# Patient Record
Sex: FEMALE | Race: WHITE | NOT HISPANIC OR LATINO | Employment: STUDENT | URBAN - METROPOLITAN AREA
[De-identification: names, ages, dates, MRNs, and addresses within clinical notes are randomized per-mention and may not be internally consistent; named-entity substitution may affect disease eponyms.]

---
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
## 2023-01-01 ENCOUNTER — APPOINTMENT (EMERGENCY)
Dept: ULTRASOUND IMAGING | Facility: HOSPITAL | Age: 18
End: 2023-01-01

## 2023-01-01 ENCOUNTER — HOSPITAL ENCOUNTER (EMERGENCY)
Facility: HOSPITAL | Age: 18
Discharge: HOME | End: 2023-01-01
Attending: EMERGENCY MEDICINE

## 2023-01-01 VITALS
HEART RATE: 95 BPM | DIASTOLIC BLOOD PRESSURE: 73 MMHG | TEMPERATURE: 97.8 F | OXYGEN SATURATION: 98 % | SYSTOLIC BLOOD PRESSURE: 124 MMHG | RESPIRATION RATE: 18 BRPM

## 2023-01-01 DIAGNOSIS — K80.20 CALCULUS OF GALLBLADDER WITHOUT CHOLECYSTITIS WITHOUT OBSTRUCTION: ICD-10-CM

## 2023-01-01 DIAGNOSIS — R10.13 EPIGASTRIC ABDOMINAL PAIN: Primary | ICD-10-CM

## 2023-01-01 LAB
ALBUMIN SERPL BCP-MCNC: 3.7 G/DL (ref 3.5–5)
ALP SERPL-CCNC: 127 U/L (ref 46–384)
ALT SERPL W P-5'-P-CCNC: 20 U/L (ref 12–78)
ANION GAP SERPL CALCULATED.3IONS-SCNC: 11 MMOL/L (ref 4–13)
APTT PPP: 40 SECONDS (ref 23–37)
AST SERPL W P-5'-P-CCNC: 51 U/L (ref 5–45)
BASOPHILS # BLD AUTO: 0.05 THOUSANDS/ÂΜL (ref 0–0.1)
BASOPHILS NFR BLD AUTO: 0 % (ref 0–1)
BILIRUB SERPL-MCNC: 0.37 MG/DL (ref 0.2–1)
BUN SERPL-MCNC: 12 MG/DL (ref 5–25)
CALCIUM SERPL-MCNC: 10 MG/DL (ref 8.3–10.1)
CHLORIDE SERPL-SCNC: 101 MMOL/L (ref 96–108)
CO2 SERPL-SCNC: 24 MMOL/L (ref 21–32)
CREAT SERPL-MCNC: 0.98 MG/DL (ref 0.6–1.3)
EOSINOPHIL # BLD AUTO: 0.03 THOUSAND/ÂΜL (ref 0–0.61)
EOSINOPHIL NFR BLD AUTO: 0 % (ref 0–6)
ERYTHROCYTE [DISTWIDTH] IN BLOOD BY AUTOMATED COUNT: 14.4 % (ref 11.6–15.1)
GFR SERPL CREATININE-BSD FRML MDRD: 83 ML/MIN/1.73SQ M
GLUCOSE SERPL-MCNC: 117 MG/DL (ref 65–140)
HCT VFR BLD AUTO: 38.3 % (ref 34.8–46.1)
HGB BLD-MCNC: 11.5 G/DL (ref 11.5–15.4)
IMM GRANULOCYTES # BLD AUTO: 0.04 THOUSAND/UL (ref 0–0.2)
IMM GRANULOCYTES NFR BLD AUTO: 0 % (ref 0–2)
INR PPP: 0.94 (ref 0.84–1.19)
LIPASE SERPL-CCNC: 73 U/L (ref 73–393)
LYMPHOCYTES # BLD AUTO: 1.52 THOUSANDS/ÂΜL (ref 0.6–4.47)
LYMPHOCYTES NFR BLD AUTO: 13 % (ref 14–44)
MCH RBC QN AUTO: 24.6 PG (ref 26.8–34.3)
MCHC RBC AUTO-ENTMCNC: 30 G/DL (ref 31.4–37.4)
MCV RBC AUTO: 82 FL (ref 82–98)
MONOCYTES # BLD AUTO: 0.52 THOUSAND/ÂΜL (ref 0.17–1.22)
MONOCYTES NFR BLD AUTO: 4 % (ref 4–12)
NEUTROPHILS # BLD AUTO: 9.87 THOUSANDS/ÂΜL (ref 1.85–7.62)
NEUTS SEG NFR BLD AUTO: 83 % (ref 43–75)
NRBC BLD AUTO-RTO: 0 /100 WBCS
PLATELET # BLD AUTO: 438 THOUSANDS/UL (ref 149–390)
PMV BLD AUTO: 10.6 FL (ref 8.9–12.7)
POTASSIUM SERPL-SCNC: 3.5 MMOL/L (ref 3.5–5.3)
PROT SERPL-MCNC: 8.5 G/DL (ref 6.4–8.4)
PROTHROMBIN TIME: 12.7 SECONDS (ref 11.6–14.5)
RBC # BLD AUTO: 4.68 MILLION/UL (ref 3.81–5.12)
SODIUM SERPL-SCNC: 136 MMOL/L (ref 135–147)
WBC # BLD AUTO: 12.03 THOUSAND/UL (ref 4.31–10.16)

## 2023-01-01 RX ORDER — ACETAMINOPHEN 325 MG/1
975 TABLET ORAL ONCE
Status: COMPLETED | OUTPATIENT
Start: 2023-01-01 | End: 2023-01-01

## 2023-01-01 RX ORDER — ONDANSETRON 4 MG/1
4 TABLET, ORALLY DISINTEGRATING ORAL EVERY 6 HOURS PRN
Qty: 20 TABLET | Refills: 0 | Status: SHIPPED | OUTPATIENT
Start: 2023-01-01

## 2023-01-01 RX ORDER — NAPROXEN 500 MG/1
500 TABLET ORAL 2 TIMES DAILY WITH MEALS
Qty: 30 TABLET | Refills: 0 | Status: SHIPPED | OUTPATIENT
Start: 2023-01-01

## 2023-01-01 RX ORDER — PANTOPRAZOLE SODIUM 40 MG/1
40 TABLET, DELAYED RELEASE ORAL DAILY
Qty: 21 TABLET | Refills: 0 | Status: SHIPPED | OUTPATIENT
Start: 2023-01-01

## 2023-01-01 RX ORDER — ONDANSETRON 4 MG/1
4 TABLET, ORALLY DISINTEGRATING ORAL ONCE
Status: COMPLETED | OUTPATIENT
Start: 2023-01-01 | End: 2023-01-01

## 2023-01-01 RX ADMIN — ACETAMINOPHEN 975 MG: 325 TABLET, FILM COATED ORAL at 05:02

## 2023-01-01 RX ADMIN — ONDANSETRON 4 MG: 4 TABLET, ORALLY DISINTEGRATING ORAL at 05:02

## 2023-01-01 NOTE — DISCHARGE INSTRUCTIONS
You have been seen for abdominal pain and nausea  Please take the prescribed protonix, zofran and naproxen as needed  Return to the emergency department if you develop worsening pain, vomiting, fevers or any other symptoms of concern  Please follow up with GI by calling the number provided

## 2023-01-01 NOTE — ED PROVIDER NOTES
History  Chief Complaint   Patient presents with   • Abdominal Pain     Patient states she has been having upper abdominal pain, had a baby three weeks ago  Patient states she had this pain during her pregnancy but it has not gone away  Maria L Cervantes is a 21y o  year old female presenting to the [place] [address] ED for epigastric abdominal pain  Patient has had three hours of epigastric pain  Currently 7/10, waxes/wanes in intensity  Intermittently radiating to the back  Associated nausea for which the patient has induced emesis  Patient had similar symptoms previously during recent pregnancy  Patient denies associated fevers, chest pain or dyspnea  No lower abdominal pain  No dysuria/hematuria/flank pain  Recently completed course of antibiotics for UTI  The patient has taken pecpid at home for symptomatic treatment  Surgical history notable for recent low transverse C section for delivery of first child  Reports minimal vaginal spotting  No pain at surgical incision site  No other abdominal surgeries previously  History provided by:  Patient   used: No    Abdominal Pain  Associated symptoms: nausea and vaginal bleeding    Associated symptoms: no chest pain, no chills, no cough, no diarrhea, no dysuria, no fever, no hematuria, no shortness of breath and no vomiting        Prior to Admission Medications   Prescriptions Last Dose Informant Patient Reported?  Taking?   docusate sodium (COLACE) 50 mg capsule   No No   Sig: Take 1 capsule (50 mg total) by mouth 2 (two) times a day   ibuprofen (MOTRIN) 600 mg tablet   No No   Sig: Take 1 tablet (600 mg total) by mouth every 6 (six) hours   Patient not taking: Reported on 2022      Facility-Administered Medications: None       Past Medical History:   Diagnosis Date   • Obesity (BMI 35 0-39 9 without comorbidity) 2022       Past Surgical History:   Procedure Laterality Date   • MYRINGOTOMY W/ TUBES     • MA  DELIVERY ONLY N/A 12/10/2022    Procedure:  SECTION (); Surgeon: Ewing Holter, DO;  Location: [facility];  Service: Obstetrics       Family History   Problem Relation Age of Onset   • Hyperlipidemia Mother    • No Known Problems Father      I have reviewed and agree with the history as documented  E-Cigarette/Vaping   • E-Cigarette Use Former User      E-Cigarette/Vaping Substances   • Nicotine Yes      Social History     Tobacco Use   • Smoking status: Never   • Smokeless tobacco: Never   Vaping Use   • Vaping Use: Former   • Substances: Nicotine   Substance Use Topics   • Alcohol use: Never   • Drug use: Never       Review of Systems   Constitutional: Negative for chills and fever  Respiratory: Negative for cough and shortness of breath  Cardiovascular: Negative for chest pain and leg swelling  Gastrointestinal: Positive for abdominal pain and nausea  Negative for abdominal distention, diarrhea and vomiting  Endocrine: Negative for polyuria  Genitourinary: Positive for vaginal bleeding  Negative for difficulty urinating, dysuria, flank pain and hematuria  Musculoskeletal: Positive for back pain  Skin: Negative for rash  Psychiatric/Behavioral: Negative for behavioral problems and confusion  All other systems reviewed and are negative  Physical Exam  Physical Exam  Vitals and nursing note reviewed  Constitutional:       General: She is not in acute distress  Appearance: Normal appearance  She is well-developed  She is not ill-appearing, toxic-appearing or diaphoretic  HENT:      Head: Normocephalic and atraumatic  Nose: No congestion or rhinorrhea  Eyes:      General:         Right eye: No discharge  Left eye: No discharge  Cardiovascular:      Rate and Rhythm: Normal rate and regular rhythm  Pulmonary:      Effort: Pulmonary effort is normal  No accessory muscle usage or respiratory distress  Breath sounds: Normal breath sounds  No stridor   No decreased breath sounds, wheezing, rhonchi or rales  Abdominal:      General: Bowel sounds are normal  There is no distension  Palpations: Abdomen is soft  Tenderness: There is abdominal tenderness in the epigastric area  There is no right CVA tenderness, left CVA tenderness, guarding or rebound  Negative signs include Kwon's sign and McBurney's sign  Musculoskeletal:      Cervical back: Normal range of motion and neck supple  Right lower leg: No tenderness  No edema  Left lower leg: No tenderness  No edema  Skin:     Capillary Refill: Capillary refill takes less than 2 seconds  Comments: Midline transverse lower abdominal incision c/d/i   Neurological:      Mental Status: She is alert and oriented to person, place, and time     Psychiatric:         Mood and Affect: Mood normal          Behavior: Behavior normal          Vital Signs  ED Triage Vitals [01/01/23 0401]   Temperature Pulse Respirations Blood Pressure SpO2   97 8 °F (36 6 °C) 95 18 124/73 98 %      Temp src Heart Rate Source Patient Position - Orthostatic VS BP Location FiO2 (%)   -- -- Lying Left arm --      Pain Score       --           Vitals:    01/01/23 0401   BP: 124/73   Pulse: 95   Patient Position - Orthostatic VS: Lying         Visual Acuity      ED Medications  Medications   ondansetron (ZOFRAN-ODT) dispersible tablet 4 mg (4 mg Oral Given 1/1/23 0502)   acetaminophen (TYLENOL) tablet 975 mg (975 mg Oral Given 1/1/23 0502)       Diagnostic Studies  Results Reviewed     Procedure Component Value Units Date/Time    Comprehensive metabolic panel [[id number]]  (Abnormal) Collected: 01/01/23 0417    Lab Status: Final result Specimen: Blood from Hand, Right Updated: 01/01/23 0443     Sodium 136 mmol/L      Potassium 3 5 mmol/L      Chloride 101 mmol/L      CO2 24 mmol/L      ANION GAP 11 mmol/L      BUN 12 mg/dL      Creatinine 0 98 mg/dL      Glucose 117 mg/dL      Calcium 10 0 mg/dL      AST 51 U/L      ALT 20 U/L Alkaline Phosphatase 127 U/L      Total Protein 8 5 g/dL      Albumin 3 7 g/dL      Total Bilirubin 0 37 mg/dL      eGFR 83 ml/min/1 73sq m     Narrative:      National Kidney Disease Foundation guidelines for Chronic Kidney Disease (CKD):   •  Stage 1 with normal or high GFR (GFR > 90 mL/min/1 73 square meters)  •  Stage 2 Mild CKD (GFR = 60-89 mL/min/1 73 square meters)  •  Stage 3A Moderate CKD (GFR = 45-59 mL/min/1 73 square meters)  •  Stage 3B Moderate CKD (GFR = 30-44 mL/min/1 73 square meters)  •  Stage 4 Severe CKD (GFR = 15-29 mL/min/1 73 square meters)  •  Stage 5 End Stage CKD (GFR <15 mL/min/1 73 square meters)  Note: GFR calculation is accurate only with a steady state creatinine    Protime-INR [[id number]]  (Normal) Collected: 01/01/23 0417    Lab Status: Final result Specimen: Blood from Arm, Right Updated: 01/01/23 0442     Protime 12 7 seconds      INR 0 94    APTT [[id number]]  (Abnormal) Collected: 01/01/23 0417    Lab Status: Final result Specimen: Blood from Arm, Right Updated: 01/01/23 0442     PTT 40 seconds     Lipase [[id number]]  (Normal) Collected: 01/01/23 0417    Lab Status: Final result Specimen: Blood from Arm, Right Updated: 01/01/23 0439     Lipase 73 u/L     CBC and differential [[id number]]  (Abnormal) Collected: 01/01/23 0417    Lab Status: Final result Specimen: Blood from Hand, Right Updated: 01/01/23 0429     WBC 12 03 Thousand/uL      RBC 4 68 Million/uL      Hemoglobin 11 5 g/dL      Hematocrit 38 3 %      MCV 82 fL      MCH 24 6 pg      MCHC 30 0 g/dL      RDW 14 4 %      MPV 10 6 fL      Platelets 554 Thousands/uL      nRBC 0 /100 WBCs      Neutrophils Relative 83 %      Immat GRANS % 0 %      Lymphocytes Relative 13 %      Monocytes Relative 4 %      Eosinophils Relative 0 %      Basophils Relative 0 %      Neutrophils Absolute 9 87 Thousands/µL      Immature Grans Absolute 0 04 Thousand/uL      Lymphocytes Absolute 1 52 Thousands/µL      Monocytes Absolute 0 52 Thousand/µL Eosinophils Absolute 0 03 Thousand/µL      Basophils Absolute 0 05 Thousands/µL                  US right upper quadrant   Final Result by Aleena Martinez MD (01/01 0703)      Gallstones  No secondary signs of cholecystitis  Workstation performed: [id number]                    Procedures  Procedures         ED Course  ED Course as of 01/01/23 0833   Jose Ramon Fill Jan 01, 2023   0645 Reassessed, pain has resolved  Well appearing in ED  Pending RUQ US for dispo  Medical Decision Making    21 y o  female presenting for epigastric pain  VSS, nontoxic appearing  TTP epigastrium without r/g  Will order labs and RUQ US to evaluate for biliary disease, pancreatitis, cholecystitis or other etiology of symptoms  Reassessment: pain resolved during ED course  Reviewed labs and US results with patient and mother at bedside  Suspect symptoms may be related to biliary colic  Disposition: I have discussed with the patient our plan to discharge them from the ED and the patient is in agreement with this plan  Discharge Plan: Rx for naproxen, zofran, protonix should symptoms return  Discussed possibility of disease progression and failure of outpatient treatment  RTED precautions emphasized  The patient was provided a written after visit summary with strict RTED precautions  Followup: I have discussed with the patient plan to follow up with general surgery and GI  Contact information provided in AVS     Calculus of gallbladder without cholecystitis without obstruction: undiagnosed new problem with uncertain prognosis  Epigastric abdominal pain: acute illness or injury  Amount and/or Complexity of Data Reviewed  Labs: ordered  Radiology: ordered  Risk  Prescription drug management            Disposition  Final diagnoses:   Epigastric abdominal pain   Calculus of gallbladder without cholecystitis without obstruction     Time reflects when diagnosis was documented in both MDM as applicable and the Disposition within this note     Time User Action Codes Description Comment    1/1/2023  6:46 AM Massiel Grater Add [R10 13] Epigastric abdominal pain     1/1/2023  7:13 AM Massiel Grater Add [K80 20] Calculus of gallbladder without cholecystitis without obstruction       ED Disposition     ED Disposition   Discharge    Condition   Stable    Date/Time   Sun Jan 1, 2023  7:08 AM    Comment   Emilio Chin discharge to home/self care  Follow-up Information     Follow up With Specialties Details Why Contact Info Additional [facility] Schedule an appointment as soon as possible for a visit  To make appointment for reevaluation in 3-5 days  [address] [id number]  Ricardo [facility] [address], [address] [place] [address]    Olga Kowalski,  General Surgery, Wound Care  For reevaluation if symptoms do not resolve   [address] Martinez   [phone]             Discharge Medication List as of 1/1/2023  7:13 AM      START taking these medications    Details   naproxen (Naprosyn) 500 mg tablet Take 1 tablet (500 mg total) by mouth 2 (two) times a day with meals, Starting Sun 1/1/2023, Normal      ondansetron (ZOFRAN-ODT) 4 mg disintegrating tablet Take 1 tablet (4 mg total) by mouth every 6 (six) hours as needed for nausea or vomiting, Starting Sun 1/1/2023, Normal      pantoprazole (PROTONIX) 40 mg tablet Take 1 tablet (40 mg total) by mouth daily, Starting Sun 1/1/2023, Normal         CONTINUE these medications which have NOT CHANGED    Details   docusate sodium (COLACE) 50 mg capsule Take 1 capsule (50 mg total) by mouth 2 (two) times a day, Starting Wed 12/14/2022, No Print      ibuprofen (MOTRIN) 600 mg tablet Take 1 tablet (600 mg total) by mouth every 6 (six) hours, Starting Wed 12/14/2022, Normal No discharge procedures on file      PDMP Review     None          ED Provider  Electronically Signed by           Thu Collins DO  01/01/23 5998

## 2023-01-21 ENCOUNTER — APPOINTMENT (EMERGENCY)
Dept: ULTRASOUND IMAGING | Facility: HOSPITAL | Age: 18
End: 2023-01-21

## 2023-01-21 ENCOUNTER — HOSPITAL ENCOUNTER (EMERGENCY)
Facility: HOSPITAL | Age: 18
Discharge: HOME | End: 2023-01-21
Attending: EMERGENCY MEDICINE

## 2023-01-21 VITALS
HEART RATE: 61 BPM | BODY MASS INDEX: 36.29 KG/M2 | SYSTOLIC BLOOD PRESSURE: 116 MMHG | DIASTOLIC BLOOD PRESSURE: 66 MMHG | WEIGHT: 224.87 LBS | OXYGEN SATURATION: 94 % | RESPIRATION RATE: 18 BRPM | TEMPERATURE: 97.6 F

## 2023-01-21 DIAGNOSIS — K80.20 CHOLELITHIASIS: Primary | ICD-10-CM

## 2023-01-21 LAB
ALBUMIN SERPL BCP-MCNC: 4 G/DL (ref 3.5–5)
ALP SERPL-CCNC: 95 U/L (ref 34–104)
ALT SERPL W P-5'-P-CCNC: 17 U/L (ref 7–52)
ANION GAP SERPL CALCULATED.3IONS-SCNC: 9 MMOL/L (ref 4–13)
AST SERPL W P-5'-P-CCNC: 69 U/L (ref 13–39)
BASOPHILS # BLD AUTO: 0.04 THOUSANDS/ÂΜL (ref 0–0.1)
BASOPHILS NFR BLD AUTO: 0 % (ref 0–1)
BILIRUB SERPL-MCNC: 0.39 MG/DL (ref 0.2–1)
BILIRUB UR QL STRIP: NEGATIVE
BUN SERPL-MCNC: 6 MG/DL (ref 5–25)
CALCIUM SERPL-MCNC: 9.7 MG/DL (ref 8.4–10.2)
CHLORIDE SERPL-SCNC: 101 MMOL/L (ref 96–108)
CLARITY UR: CLEAR
CO2 SERPL-SCNC: 26 MMOL/L (ref 21–32)
COLOR UR: NORMAL
CREAT SERPL-MCNC: 0.77 MG/DL (ref 0.6–1.3)
EOSINOPHIL # BLD AUTO: 0.06 THOUSAND/ÂΜL (ref 0–0.61)
EOSINOPHIL NFR BLD AUTO: 0 % (ref 0–6)
ERYTHROCYTE [DISTWIDTH] IN BLOOD BY AUTOMATED COUNT: 14.6 % (ref 11.6–15.1)
EXT PREGNANCY TEST URINE: NEGATIVE
EXT. CONTROL: NORMAL
GFR SERPL CREATININE-BSD FRML MDRD: 112 ML/MIN/1.73SQ M
GLUCOSE SERPL-MCNC: 98 MG/DL (ref 65–140)
GLUCOSE UR STRIP-MCNC: NEGATIVE MG/DL
HCT VFR BLD AUTO: 34.6 % (ref 34.8–46.1)
HGB BLD-MCNC: 10.7 G/DL (ref 11.5–15.4)
HGB UR QL STRIP.AUTO: NEGATIVE
IMM GRANULOCYTES # BLD AUTO: 0.06 THOUSAND/UL (ref 0–0.2)
IMM GRANULOCYTES NFR BLD AUTO: 0 % (ref 0–2)
KETONES UR STRIP-MCNC: NEGATIVE MG/DL
LEUKOCYTE ESTERASE UR QL STRIP: NEGATIVE
LIPASE SERPL-CCNC: 19 U/L (ref 11–82)
LYMPHOCYTES # BLD AUTO: 1.33 THOUSANDS/ÂΜL (ref 0.6–4.47)
LYMPHOCYTES NFR BLD AUTO: 9 % (ref 14–44)
MCH RBC QN AUTO: 25 PG (ref 26.8–34.3)
MCHC RBC AUTO-ENTMCNC: 30.9 G/DL (ref 31.4–37.4)
MCV RBC AUTO: 81 FL (ref 82–98)
MONOCYTES # BLD AUTO: 0.68 THOUSAND/ÂΜL (ref 0.17–1.22)
MONOCYTES NFR BLD AUTO: 5 % (ref 4–12)
NEUTROPHILS # BLD AUTO: 11.94 THOUSANDS/ÂΜL (ref 1.85–7.62)
NEUTS SEG NFR BLD AUTO: 86 % (ref 43–75)
NITRITE UR QL STRIP: NEGATIVE
NRBC BLD AUTO-RTO: 0 /100 WBCS
PH UR STRIP.AUTO: 6 [PH]
PLATELET # BLD AUTO: 332 THOUSANDS/UL (ref 149–390)
PMV BLD AUTO: 10.6 FL (ref 8.9–12.7)
POTASSIUM SERPL-SCNC: 3.5 MMOL/L (ref 3.5–5.3)
PROT SERPL-MCNC: 7.5 G/DL (ref 6.4–8.4)
PROT UR STRIP-MCNC: NEGATIVE MG/DL
RBC # BLD AUTO: 4.28 MILLION/UL (ref 3.81–5.12)
SODIUM SERPL-SCNC: 136 MMOL/L (ref 135–147)
SP GR UR STRIP.AUTO: <=1.005 (ref 1–1.03)
UROBILINOGEN UR QL STRIP.AUTO: 0.2 E.U./DL
WBC # BLD AUTO: 14.11 THOUSAND/UL (ref 4.31–10.16)

## 2023-01-21 RX ORDER — KETOROLAC TROMETHAMINE 30 MG/ML
10 INJECTION, SOLUTION INTRAMUSCULAR; INTRAVENOUS ONCE
Status: COMPLETED | OUTPATIENT
Start: 2023-01-21 | End: 2023-01-21

## 2023-01-21 RX ADMIN — KETOROLAC TROMETHAMINE 9.9 MG: 30 INJECTION, SOLUTION INTRAMUSCULAR; INTRAVENOUS at 06:27

## 2023-01-21 NOTE — ED PROVIDER NOTES
History  Chief Complaint   Patient presents with   • Abdominal Pain     Mid abdominal that radiates into upper back  Per pt pain has been going on for months, thought was due to pregnancy which gave birth in December  Pain comes and goes per pt  Per pt vomited x2 around [id number]     This is a 23-year-old woman with the noted past medical history who presents to the emergency department with epigastric to right upper quadrant pain radiating into the back occurring over about the past 5 hours  She did vomit at the peak of the pain, but the pain has become less severe since onset and at this point is mostly resolved  This is a rather severe recurrence of pain the patient had been having intermittently for about the past 4 5 months or so  She was seen in this emergency department on 2023 with similar pain and found to have cholelithiasis without evidence of acute cholecystitis  She has had several episodes of pain between  and now that were of shorter duration  They seemed to occur mostly at night  They are not associated with eating  They have never lasted as long as the current symptoms did  She never really needed to take anything to treat the symptoms previously  She did take ondansetron during the current episode but did not take any analgesics per se to resolve the pain  She has an appointment scheduled with her primary physician on Monday,  for further evaluation  She underwent a  in 1945 that was uncomplicated and for which she had otherwise been recovering well  No other GI or  surgery  No fevers/dysuria/hematuria/urgency/frequency  DDx includes but is not limited to: Biliary colic, cholecystitis, choledocholithiasis  If any surgical findings, will obviously consult with general surgery  Otherwise, patient can see primary physician and/or surgeon for further evaluation    She is sufficiently symptomatic on a sufficiently prolonged basis that elective cholecystectomy can certainly be considered  Check CBC/CMP/lipase/UA/UPT and right upper quadrant ultrasound  History provided by:  Medical records and patient  Abdominal Pain  Associated symptoms: nausea and vomiting    Associated symptoms: no chills, no diarrhea, no dysuria, no fatigue, no fever and no hematuria        Prior to Admission Medications   Prescriptions Last Dose Informant Patient Reported? Taking?   docusate sodium (COLACE) 50 mg capsule   No No   Sig: Take 1 capsule (50 mg total) by mouth 2 (two) times a day   ibuprofen (MOTRIN) 600 mg tablet   No No   Sig: Take 1 tablet (600 mg total) by mouth every 6 (six) hours   Patient not taking: Reported on 2022   naproxen (Naprosyn) 500 mg tablet   No No   Sig: Take 1 tablet (500 mg total) by mouth 2 (two) times a day with meals   ondansetron (ZOFRAN-ODT) 4 mg disintegrating tablet   No No   Sig: Take 1 tablet (4 mg total) by mouth every 6 (six) hours as needed for nausea or vomiting   pantoprazole (PROTONIX) 40 mg tablet   No No   Sig: Take 1 tablet (40 mg total) by mouth daily      Facility-Administered Medications: None       Past Medical History:   Diagnosis Date   • Obesity (BMI 35 0-39 9 without comorbidity) 2022       Past Surgical History:   Procedure Laterality Date   • MYRINGOTOMY W/ TUBES     • [place]  DELIVERY ONLY N/A 12/10/2022    Procedure:  SECTION (); Surgeon: Tiffanie Lewis DO;  Location: [facility];  Service: Obstetrics       Family History   Problem Relation Age of Onset   • Hyperlipidemia Mother    • No Known Problems Father      I have reviewed and agree with the history as documented      E-Cigarette/Vaping   • E-Cigarette Use Former User      E-Cigarette/Vaping Substances   • Nicotine Yes      Social History     Tobacco Use   • Smoking status: Never   • Smokeless tobacco: Never   Vaping Use   • Vaping Use: Former   • Substances: Nicotine   Substance Use Topics   • Alcohol use: Never   • Drug use: Never       Review of Systems   Constitutional: Negative for chills, diaphoresis, fatigue and fever  Respiratory: Negative  Cardiovascular: Negative  Gastrointestinal: Positive for abdominal pain, nausea and vomiting  Negative for abdominal distention and diarrhea  Genitourinary: Negative for dysuria, flank pain, hematuria, pelvic pain and urgency  Musculoskeletal: Negative  Hematological: Negative  Physical Exam  Physical Exam  Vitals and nursing note reviewed  Constitutional:       General: She is awake  She is not in acute distress  Appearance: Normal appearance  She is well-developed  HENT:      Head: Normocephalic and atraumatic  Right Ear: Hearing and external ear normal       Left Ear: Hearing and external ear normal    Neck:      Trachea: Trachea and phonation normal    Cardiovascular:      Rate and Rhythm: Normal rate and regular rhythm  Pulses:           Radial pulses are 2+ on the right side and 2+ on the left side  Dorsalis pedis pulses are 2+ on the right side and 2+ on the left side  Posterior tibial pulses are 2+ on the right side and 2+ on the left side  Heart sounds: Normal heart sounds, S1 normal and S2 normal  No murmur heard  No friction rub  No gallop  Pulmonary:      Effort: Pulmonary effort is normal  No respiratory distress  Breath sounds: Normal breath sounds  No stridor  No decreased breath sounds, wheezing, rhonchi or rales  Abdominal:      General: There is no distension  Palpations: There is no mass  Tenderness: There is abdominal tenderness in the right upper quadrant  There is no right CVA tenderness, left CVA tenderness, guarding or rebound  Negative signs include Kwon's sign  Comments: Well-healed low transverse  incision   Skin:     General: Skin is warm and dry  Neurological:      Mental Status: She is alert and oriented to person, place, and time  GCS: GCS eye subscore is 4   GCS verbal subscore is 5  GCS motor subscore is 6  Cranial Nerves: No cranial nerve deficit  Sensory: No sensory deficit  Motor: No abnormal muscle tone  Comments: PERRLA; EOMI  Sensation intact to light touch over face in V1-V3 distribution bilaterally  Facial expressions symmetric  Tongue/uvula midline  Shoulder shrug equal bilaterally  Strength 5/5 in UE/LE bilaterally  Sensation intact to light touch in UE/LE bilaterally           Vital Signs  ED Triage Vitals   Temperature Pulse Respirations Blood Pressure SpO2   01/21/23 0519 01/21/23 0519 01/21/23 0519 01/21/23 0519 01/21/23 0519   (!) 96 9 °F (36 1 °C) 84 18 118/66 96 %      Temp Source Heart Rate Source Patient Position - Orthostatic VS BP Location FiO2 (%)   01/21/23 0519 01/21/23 0519 01/21/23 0815 01/21/23 0815 --   Temporal Monitor Lying Right arm       Pain Score       01/21/23 0519       4           Vitals:    01/21/23 0519 01/21/23 0815   BP: 118/66 116/66   Pulse: 84 61   Patient Position - Orthostatic VS:  Lying         Visual Acuity      ED Medications  Medications   ketorolac (TORADOL) injection 9 9 mg (9 9 mg Intravenous Given 1/21/23 6364)       Diagnostic Studies  Results Reviewed     Procedure Component Value Units Date/Time    UA w Reflex to Microscopic w Reflex to Culture [[id number]] Collected: 01/21/23 0731    Lab Status: Final result Specimen: Urine, Clean Catch Updated: 01/21/23 0752     Color, UA Light Yellow     Clarity, UA Clear     Specific Gravity, UA <=1 005     pH, UA 6 0     Leukocytes, UA Negative     Nitrite, UA Negative     Protein, UA Negative mg/dl      Glucose, UA Negative mg/dl      Ketones, UA Negative mg/dl      Urobilinogen, UA 0 2 E U /dl      Bilirubin, UA Negative     Occult Blood, UA Negative    POCT pregnancy, urine [[id number]]  (Normal) Resulted: 01/21/23 0732    Lab Status: Final result Specimen: Urine Updated: 01/21/23 0732     EXT Preg Test, Ur Negative     Control Valid    CMP [[id number]] (Abnormal) Collected: 01/21/23 0559    Lab Status: Final result Specimen: Blood from Arm, Right Updated: 01/21/23 0631     Sodium 136 mmol/L      Potassium 3 5 mmol/L      Chloride 101 mmol/L      CO2 26 mmol/L      ANION GAP 9 mmol/L      BUN 6 mg/dL      Creatinine 0 77 mg/dL      Glucose 98 mg/dL      Calcium 9 7 mg/dL      AST 69 U/L      ALT 17 U/L      Alkaline Phosphatase 95 U/L      Total Protein 7 5 g/dL      Albumin 4 0 g/dL      Total Bilirubin 0 39 mg/dL      eGFR 112 ml/min/1 73sq m     Narrative:      [facility] guidelines for Chronic Kidney Disease (CKD):   •  Stage 1 with normal or high GFR (GFR > 90 mL/min/1 73 square meters)  •  Stage 2 Mild CKD (GFR = 60-89 mL/min/1 73 square meters)  •  Stage 3A Moderate CKD (GFR = 45-59 mL/min/1 73 square meters)  •  Stage 3B Moderate CKD (GFR = 30-44 mL/min/1 73 square meters)  •  Stage 4 Severe CKD (GFR = 15-29 mL/min/1 73 square meters)  •  Stage 5 End Stage CKD (GFR <15 mL/min/1 73 square meters)  Note: GFR calculation is accurate only with a steady state creatinine    Lipase [[id number]]  (Normal) Collected: 01/21/23 0559    Lab Status: Final result Specimen: Blood from Arm, Right Updated: 01/21/23 0631     Lipase 19 u/L     CBC and differential [[id number]]  (Abnormal) Collected: 01/21/23 0559    Lab Status: Final result Specimen: Blood from Arm, Right Updated: 01/21/23 0611     WBC 14 11 Thousand/uL      RBC 4 28 Million/uL      Hemoglobin 10 7 g/dL      Hematocrit 34 6 %      MCV 81 fL      MCH 25 0 pg      MCHC 30 9 g/dL      RDW 14 6 %      MPV 10 6 fL      Platelets 513 Thousands/uL      nRBC 0 /100 WBCs      Neutrophils Relative 86 %      Immat GRANS % 0 %      Lymphocytes Relative 9 %      Monocytes Relative 5 %      Eosinophils Relative 0 %      Basophils Relative 0 %      Neutrophils Absolute 11 94 Thousands/µL      Immature Grans Absolute 0 06 Thousand/uL      Lymphocytes Absolute 1 33 Thousands/µL      Monocytes Absolute 0 68 Thousand/µL      Eosinophils Absolute 0 06 Thousand/µL      Basophils Absolute 0 04 Thousands/µL                  US right upper quadrant   Final Result by Sherin Petit MD (01/21 5918)      Cholelithiasis with no evidence of acute cholecystitis  Normal caliber common bile duct  Workstation performed: [id number]                    Procedures  Procedures         ED Course  ED Course as of 01/22/23 1652   Sat Jan 21, 2023   0615 CBC and differential(!)  There is a mild leukocytosis  Anemia representing a mild decrement compared to prior  Platelets normal   2318 Lipase  Normal   0633 CMP(!)  Very mildly increased AST (increased compared to prior)  Other transaminases wnl  Electrolytes wnl   0657 Patient updated regarding results of workup thus far  Awaiting RUQ US at this point  4597 POCT pregnancy, urine  Negative   0748 Sign over to Dr Gabriela Galindo             MDM    Disposition  Final diagnoses:   Cholelithiasis     Time reflects when diagnosis was documented in both MDM as applicable and the Disposition within this note     Time User Action Codes Description Comment    1/21/2023  8:26 AM Bobo Garcia Add [K80 20] Cholelithiasis       ED Disposition     ED Disposition   Discharge    Condition   Stable    Date/Time   Sat Jan 21, 2023  8:25 AM    Comment   Kathy Chin discharge to home/self care                 Follow-up Information     Follow up With Specialties Details Why Contact Info Additional Ariel Mccallum [address] General Surgery   [address] [address]  [address] [facility] [address], [place], [address] [facility], [address] [facility]          Discharge Medication List as of 1/21/2023  8:26 AM      CONTINUE these medications which have NOT CHANGED    Details   docusate sodium (COLACE) 50 mg capsule Take 1 capsule (50 mg total) by mouth 2 (two) times a day, Starting Wed 12/14/2022, No Print      ibuprofen (MOTRIN) 600 mg tablet Take 1 tablet (600 mg total) by mouth every 6 (six) hours, Starting Wed 12/14/2022, Normal      naproxen (Naprosyn) 500 mg tablet Take 1 tablet (500 mg total) by mouth 2 (two) times a day with meals, Starting Sun 1/1/2023, Normal      ondansetron (ZOFRAN-ODT) 4 mg disintegrating tablet Take 1 tablet (4 mg total) by mouth every 6 (six) hours as needed for nausea or vomiting, Starting Sun 1/1/2023, Normal      pantoprazole (PROTONIX) 40 mg tablet Take 1 tablet (40 mg total) by mouth daily, Starting Sun 1/1/2023, Normal             No discharge procedures on file      PDMP Review     None          ED Provider  Electronically Signed by           Jannet Quintanilla DO  01/22/23 8402

## 2023-01-24 ENCOUNTER — HOSPITAL ENCOUNTER (EMERGENCY)
Facility: HOSPITAL | Age: 18
Discharge: HOME | End: 2023-01-24
Attending: EMERGENCY MEDICINE

## 2023-01-24 VITALS
TEMPERATURE: 97.8 F | DIASTOLIC BLOOD PRESSURE: 64 MMHG | SYSTOLIC BLOOD PRESSURE: 129 MMHG | HEART RATE: 65 BPM | OXYGEN SATURATION: 98 % | BODY MASS INDEX: 33.91 KG/M2 | HEIGHT: 66 IN | WEIGHT: 211 LBS | RESPIRATION RATE: 20 BRPM

## 2023-01-24 DIAGNOSIS — R79.89 ELEVATED LFTS: ICD-10-CM

## 2023-01-24 DIAGNOSIS — K80.20 GALLSTONES: ICD-10-CM

## 2023-01-24 DIAGNOSIS — K80.50 BILIARY COLIC: ICD-10-CM

## 2023-01-24 DIAGNOSIS — R10.11 RUQ PAIN: Primary | ICD-10-CM

## 2023-01-24 LAB
ALBUMIN SERPL BCP-MCNC: 3.9 G/DL (ref 3.5–5)
ALP SERPL-CCNC: 141 U/L (ref 34–104)
ALT SERPL W P-5'-P-CCNC: 57 U/L (ref 7–52)
ANION GAP SERPL CALCULATED.3IONS-SCNC: 9 MMOL/L (ref 4–13)
AST SERPL W P-5'-P-CCNC: 225 U/L (ref 13–39)
BASOPHILS # BLD AUTO: 0.06 THOUSANDS/ÂΜL (ref 0–0.1)
BASOPHILS NFR BLD AUTO: 1 % (ref 0–1)
BILIRUB SERPL-MCNC: 0.75 MG/DL (ref 0.2–1)
BILIRUB UR QL STRIP: NEGATIVE
BUN SERPL-MCNC: 8 MG/DL (ref 5–25)
CALCIUM SERPL-MCNC: 9.5 MG/DL (ref 8.4–10.2)
CHLORIDE SERPL-SCNC: 104 MMOL/L (ref 96–108)
CLARITY UR: CLEAR
CO2 SERPL-SCNC: 24 MMOL/L (ref 21–32)
COLOR UR: YELLOW
CREAT SERPL-MCNC: 0.74 MG/DL (ref 0.6–1.3)
EOSINOPHIL # BLD AUTO: 0.05 THOUSAND/ÂΜL (ref 0–0.61)
EOSINOPHIL NFR BLD AUTO: 0 % (ref 0–6)
ERYTHROCYTE [DISTWIDTH] IN BLOOD BY AUTOMATED COUNT: 14.3 % (ref 11.6–15.1)
EXT PREGNANCY TEST URINE: NEGATIVE
EXT. CONTROL: NORMAL
GFR SERPL CREATININE-BSD FRML MDRD: 117 ML/MIN/1.73SQ M
GLUCOSE SERPL-MCNC: 115 MG/DL (ref 65–140)
GLUCOSE UR STRIP-MCNC: NEGATIVE MG/DL
HCT VFR BLD AUTO: 36.7 % (ref 34.8–46.1)
HGB BLD-MCNC: 11.2 G/DL (ref 11.5–15.4)
HGB UR QL STRIP.AUTO: NEGATIVE
IMM GRANULOCYTES # BLD AUTO: 0.05 THOUSAND/UL (ref 0–0.2)
IMM GRANULOCYTES NFR BLD AUTO: 0 % (ref 0–2)
KETONES UR STRIP-MCNC: NEGATIVE MG/DL
LEUKOCYTE ESTERASE UR QL STRIP: NEGATIVE
LIPASE SERPL-CCNC: 10 U/L (ref 11–82)
LYMPHOCYTES # BLD AUTO: 1.47 THOUSANDS/ÂΜL (ref 0.6–4.47)
LYMPHOCYTES NFR BLD AUTO: 12 % (ref 14–44)
MCH RBC QN AUTO: 24.3 PG (ref 26.8–34.3)
MCHC RBC AUTO-ENTMCNC: 30.5 G/DL (ref 31.4–37.4)
MCV RBC AUTO: 80 FL (ref 82–98)
MONOCYTES # BLD AUTO: 0.62 THOUSAND/ÂΜL (ref 0.17–1.22)
MONOCYTES NFR BLD AUTO: 5 % (ref 4–12)
NEUTROPHILS # BLD AUTO: 10.02 THOUSANDS/ÂΜL (ref 1.85–7.62)
NEUTS SEG NFR BLD AUTO: 82 % (ref 43–75)
NITRITE UR QL STRIP: NEGATIVE
NRBC BLD AUTO-RTO: 0 /100 WBCS
PH UR STRIP.AUTO: 6 [PH] (ref 4.5–8)
PLATELET # BLD AUTO: 378 THOUSANDS/UL (ref 149–390)
PMV BLD AUTO: 10.7 FL (ref 8.9–12.7)
POTASSIUM SERPL-SCNC: 3.7 MMOL/L (ref 3.5–5.3)
PROT SERPL-MCNC: 7.7 G/DL (ref 6.4–8.4)
PROT UR STRIP-MCNC: NEGATIVE MG/DL
RBC # BLD AUTO: 4.6 MILLION/UL (ref 3.81–5.12)
SODIUM SERPL-SCNC: 137 MMOL/L (ref 135–147)
SP GR UR STRIP.AUTO: 1.02 (ref 1–1.03)
UROBILINOGEN UR QL STRIP.AUTO: 0.2 E.U./DL
WBC # BLD AUTO: 12.27 THOUSAND/UL (ref 4.31–10.16)

## 2023-01-24 RX ORDER — ONDANSETRON 2 MG/ML
4 INJECTION INTRAMUSCULAR; INTRAVENOUS ONCE
Status: COMPLETED | OUTPATIENT
Start: 2023-01-24 | End: 2023-01-24

## 2023-01-24 RX ORDER — AMOXICILLIN AND CLAVULANATE POTASSIUM 875; 125 MG/1; MG/1
1 TABLET, FILM COATED ORAL EVERY 12 HOURS
Qty: 10 TABLET | Refills: 0 | Status: SHIPPED | OUTPATIENT
Start: 2023-01-24 | End: 2023-01-29

## 2023-01-24 RX ORDER — OXYCODONE HYDROCHLORIDE AND ACETAMINOPHEN 5; 325 MG/1; MG/1
1 TABLET ORAL EVERY 8 HOURS PRN
Qty: 5 TABLET | Refills: 0 | Status: SHIPPED | OUTPATIENT
Start: 2023-01-24 | End: 2023-02-11

## 2023-01-24 RX ORDER — KETOROLAC TROMETHAMINE 30 MG/ML
15 INJECTION, SOLUTION INTRAMUSCULAR; INTRAVENOUS ONCE
Status: COMPLETED | OUTPATIENT
Start: 2023-01-24 | End: 2023-01-24

## 2023-01-24 RX ADMIN — SODIUM CHLORIDE 1000 ML: 0.9 INJECTION, SOLUTION INTRAVENOUS at 08:12

## 2023-01-24 RX ADMIN — KETOROLAC TROMETHAMINE 15 MG: 30 INJECTION, SOLUTION INTRAMUSCULAR; INTRAVENOUS at 08:20

## 2023-01-24 RX ADMIN — ONDANSETRON 4 MG: 2 INJECTION INTRAMUSCULAR; INTRAVENOUS at 08:17

## 2023-01-24 NOTE — ED NOTES
Discharged by provider, nursing staff unaware, unable to reassess vitals      Robert Smalls RN  01/24/23 1014

## 2023-01-24 NOTE — CONSULTS
Consultation - Acute Care Surgery   Lucius Barthel 21 y o  female MRN: [id number]  Unit/Bed#: TR-01 Encounter: [id number]      Assessment/Plan      Assessment:  Patient's 78-year-old female with remote history of biliary colic  Presents with intermittent right upper quadrant pain occurring for the last 7 months  Signs are stable  Afebrile  Abdomen is soft nontender nondistended  No Kwon sign  Right upper quadrant ultrasound demonstrating gallstones without pericholecystic fluid or wall thickening  WBC 13  AST//57  Alkaline phosphatase 141  Bilirubin 0 75      Plan:  Low-fat diet  Augmentin for 5 days  Follow-up LFTs in 1 week  Follow-up in general surgery clinic at your earliest convenience to discuss elective laparoscopic cholecystectomy  History of Present Illness   Physician Requesting Consult: No att  providers found  Reason for Consult / Principal Problem: biliary colic  History, ROS and PFSH unobtainable from any source due to none  HPI: Lucius Barthel is a 21y o  year old female past medical history of recent pregnancy who presents with biliary colic and 6 episodes of biliary colic occurring approximately 30 minutes after meals and overnight throughout the last month  The symptoms primarily started after her  in 2022 however she was also having the symptoms during her pregnancy  She denies any fevers chills or night sweats today  She would like to continue follow-up and plan a elective cholecystectomy at [address] [place] [address] as she delivered her child here in December  She denies any fevers chills night sweats today  She denied any prior abdominal surgeries other than her   She denies any current nausea vomiting  She would like to be discharged and follow-up as her sister is having a birthday party today  She was educated on the importance of following up in the clinic possibly next week to discuss elective cholecystectomy    She is agreeable  Inpatient consult to Acute Care Surgery  Consult performed by: Adry Conti MD  Consult ordered by: Clyde Fam MD          Review of Systems   Constitutional: Negative for chills and fever  HENT: Negative  Eyes: Negative  Respiratory: Negative  Cardiovascular: Negative  Gastrointestinal: Negative for abdominal distention, nausea and vomiting  Endocrine: Negative  Genitourinary: Negative  Musculoskeletal: Negative  Skin: Negative  Allergic/Immunologic: Negative  Neurological: Negative  Hematological: Negative  Psychiatric/Behavioral: Negative  Historical Information   Past Medical History:   Diagnosis Date   • Obesity (BMI 35 0-39 9 without comorbidity) 2022     Past Surgical History:   Procedure Laterality Date   • MYRINGOTOMY W/ TUBES     • [place]  DELIVERY ONLY N/A 12/10/2022    Procedure:  SECTION (); Surgeon: Dionte Meng DO;  [facility];  Service: Obstetrics     Social History   Social History     Substance and Sexual Activity   Alcohol Use Never     Social History     Substance and Sexual Activity   Drug Use Never     E-Cigarette/Vaping   • E-Cigarette Use Current Some Day User      E-Cigarette/Vaping Substances   • Nicotine Yes      Social History     Tobacco Use   Smoking Status Never   Smokeless Tobacco Never     Family History: non-contributory}    Meds/Allergies   all current active meds have been reviewed  No Known Allergies    Objective   Vitals: Blood pressure 129/64, pulse 65, temperature 97 8 °F (36 6 °C), temperature source Oral, resp  rate 20, height 5' 6" (1 676 m), weight 95 7 kg (211 lb), SpO2 98 %, unknown if currently breastfeeding  ,Body mass index is 34 06 kg/m²      Intake/Output Summary (Last 24 hours) at 2023 1036  Last data filed at 2023 0912  Gross per 24 hour   Intake 1000 ml   Output --   Net 1000 ml     Invasive Devices     None                 Physical Exam  Vitals reviewed  Constitutional:       Appearance: Normal appearance  HENT:      Head: Normocephalic and atraumatic  Nose: Nose normal    Eyes:      Pupils: Pupils are equal, round, and reactive to light  Cardiovascular:      Rate and Rhythm: Normal rate  Pulses: Normal pulses  Pulmonary:      Effort: Pulmonary effort is normal    Abdominal:      General: There is no distension  Palpations: Abdomen is soft  Tenderness: There is no abdominal tenderness  Genitourinary:     Comments: deferred  Musculoskeletal:         General: Normal range of motion  Cervical back: Normal range of motion  Skin:     General: Skin is warm  Capillary Refill: Capillary refill takes 2 to 3 seconds  Neurological:      General: No focal deficit present  Mental Status: She is alert and oriented to person, place, and time  Psychiatric:         Mood and Affect: Mood normal          Lab Results:   I have personally reviewed pertinent reports  , Coags: No results found for: PT, PTT, INR, Creatinine:   Lab Results   Component Value Date    CREATININE 0 74 01/24/2023   , Lipid Panel: No results found for: CHOL, CBC with diff:   Lab Results   Component Value Date    WBC 12 27 (H) 01/24/2023    HGB 11 2 (L) 01/24/2023    HCT 36 7 01/24/2023    MCV 80 (L) 01/24/2023     01/24/2023    MCH 24 3 (L) 01/24/2023    MCHC 30 5 (L) 01/24/2023    RDW 14 3 01/24/2023    MPV 10 7 01/24/2023    NRBC 0 01/24/2023   , BMP/CMP:   Lab Results   Component Value Date    SODIUM 137 01/24/2023    K 3 7 01/24/2023     01/24/2023    CO2 24 01/24/2023    BUN 8 01/24/2023    CREATININE 0 74 01/24/2023    CALCIUM 9 5 01/24/2023     (H) 01/24/2023    ALT 57 (H) 01/24/2023    ALKPHOS 141 (H) 01/24/2023    EGFR 117 01/24/2023     Imaging Studies: I have personally reviewed pertinent reports  EKG, Pathology, and Other Studies: I have personally reviewed pertinent reports      VTE Prophylaxis: Sequential compression device Ghassan Padilla      Code Status: Prior  Advance Directive and Living Will:      Power of :    POLST:      Counseling / Coordination of Care  Counseling/Coordination of Care: Total floor / unit time spent today 30 minutes  Greater than 50% of total time was spent with the patient and / or family counseling and / or coordination of care   A description of the counseling / coordination of care: 30

## 2023-01-24 NOTE — ED PROVIDER NOTES
History  Chief Complaint   Patient presents with   • Abdominal Pain     Pt c/o abdominal that radates to the back starting since 12 today        History provided by:  Patient, medical records and relative (Father with the patient)   used: No    Abdominal Pain  Pain location:  Epigastric  Pain quality: squeezing    Pain quality: not aching    Pain radiates to:  Back (Seems to wrap around both ways)  Pain severity:  Severe  Onset quality:  Gradual  Duration: Can last short duration to several hours  Timing:  Intermittent  Progression:  Unchanged  Chronicity:  New (She has had it about 5 times over the last month )  Context: not alcohol use    She was seen in the [facility] emergency department on the  and had an ultrasound  The ultrasound results revealed:  BILIARY:  The gallbladder is normal in caliber  No wall thickening or pericholecystic fluid  Shadowing gallstone(s) identified  No sonographic Kwon's sign  No intrahepatic biliary dilatation  CBD measures 3 0 mm  No choledocholithiasis  She was discharged with pain medication and follow-up with primary care  Her follow-up appointment was canceled and not scheduled for another couple weeks  Last night the pain started again at around 1230  Her last meal was 8 PM   It is continued till now  It became very severe but is not as bad at the present time  She will get nausea and vomiting  There is been no diarrhea  No urinary symptoms  She had a  in December has been recovering well from that  She does not breast-feed  He thinks she may be starting to get her period but there is no major vaginal discharge or bleeding  She is no urinary complaints  There is been no fevers  She does get chills  No chest pain or shortness of breath  Prior to Admission Medications   Prescriptions Last Dose Informant Patient Reported?  Taking?   docusate sodium (COLACE) 50 mg capsule   No No   Sig: Take 1 capsule (50 mg total) by mouth 2 (two) times a day   ibuprofen (MOTRIN) 600 mg tablet   No No   Sig: Take 1 tablet (600 mg total) by mouth every 6 (six) hours   Patient not taking: Reported on 2022   naproxen (Naprosyn) 500 mg tablet   No No   Sig: Take 1 tablet (500 mg total) by mouth 2 (two) times a day with meals   ondansetron (ZOFRAN-ODT) 4 mg disintegrating tablet   No No   Sig: Take 1 tablet (4 mg total) by mouth every 6 (six) hours as needed for nausea or vomiting   pantoprazole (PROTONIX) 40 mg tablet   No No   Sig: Take 1 tablet (40 mg total) by mouth daily      Facility-Administered Medications: None       Past Medical History:   Diagnosis Date   • Obesity (BMI 35 0-39 9 without comorbidity) 2022       Past Surgical History:   Procedure Laterality Date   • MYRINGOTOMY W/ TUBES     • NV  DELIVERY ONLY N/A 12/10/2022    Procedure:  SECTION (); Surgeon: Irasema Shultz DO;  Location: [facility];  Service: Obstetrics       Family History   Problem Relation Age of Onset   • Hyperlipidemia Mother    • No Known Problems Father      I have reviewed and agree with the history as documented  E-Cigarette/Vaping   • E-Cigarette Use Current Some Day User      E-Cigarette/Vaping Substances   • Nicotine Yes      Social History     Tobacco Use   • Smoking status: Never   • Smokeless tobacco: Never   Vaping Use   • Vaping Use: Some days   • Substances: Nicotine   Substance Use Topics   • Alcohol use: Never   • Drug use: Never       Review of Systems   Gastrointestinal: Positive for abdominal pain  All other systems reviewed and are negative  Physical Exam  Physical Exam  Vitals and nursing note reviewed  Constitutional:       General: She is not in acute distress  Appearance: Normal appearance  She is well-developed  She is obese  She is not ill-appearing, toxic-appearing or diaphoretic  HENT:      Head: Normocephalic and atraumatic        Right Ear: Hearing normal  No drainage or swelling  Left Ear: Hearing normal  No drainage or swelling  Eyes:      General: Lids are normal          Right eye: No discharge  Left eye: No discharge  Conjunctiva/sclera: Conjunctivae normal    Neck:      Vascular: No JVD  Trachea: Trachea normal    Cardiovascular:      Rate and Rhythm: Normal rate and regular rhythm  Pulses: Normal pulses  Heart sounds: Normal heart sounds  No murmur heard  No friction rub  No gallop  Pulmonary:      Effort: Pulmonary effort is normal  No respiratory distress  Breath sounds: Normal breath sounds  No stridor  No wheezing or rales  Abdominal:      General: Abdomen is protuberant  Bowel sounds are normal       Palpations: Abdomen is soft  Tenderness: There is abdominal tenderness in the right upper quadrant and epigastric area  There is no right CVA tenderness, left CVA tenderness, guarding or rebound  Positive signs include Kwon's sign  Negative signs include McBurney's sign  Comments: Kwon mild but there  Musculoskeletal:         General: Normal range of motion  Cervical back: Normal range of motion  Skin:     General: Skin is warm and dry  Coloration: Skin is not pale  Findings: No rash  Neurological:      General: No focal deficit present  Mental Status: She is alert  GCS: GCS eye subscore is 4  GCS verbal subscore is 5  GCS motor subscore is 6  Sensory: No sensory deficit  Motor: No abnormal muscle tone  Psychiatric:         Mood and Affect: Mood normal          Speech: Speech normal          Behavior: Behavior is cooperative           Vital Signs  ED Triage Vitals [01/24/23 0732]   Temperature Pulse Respirations Blood Pressure SpO2   97 8 °F (36 6 °C) 65 20 129/64 98 %      Temp Source Heart Rate Source Patient Position - Orthostatic VS BP Location FiO2 (%)   Oral Monitor Sitting Right arm --      Pain Score       6           Vitals:    01/24/23 0732   BP: 129/64   Pulse: 65   Patient Position - Orthostatic VS: Sitting         Visual Acuity      ED Medications  Medications   ondansetron (ZOFRAN) injection 4 mg (4 mg Intravenous Given 1/24/23 0817)   ketorolac (TORADOL) injection 15 mg (15 mg Intravenous Given 1/24/23 0820)   sodium chloride 0 9 % bolus 1,000 mL (0 mL Intravenous Stopped 1/24/23 0912)       Diagnostic Studies  Results Reviewed     Procedure Component Value Units Date/Time    POCT pregnancy, urine [[id number]]  (Normal) Resulted: 01/24/23 0915    Lab Status: Final result Updated: 01/24/23 0915     EXT Preg Test, Ur Negative     Control Valid    Urine Macroscopic, POC [[id number]] Collected: 01/24/23 0856    Lab Status: Final result Specimen: Urine Updated: 01/24/23 0857     Color, UA Yellow     Clarity, UA Clear     pH, UA 6 0     Leukocytes, UA Negative     Nitrite, UA Negative     Protein, UA Negative mg/dl      Glucose, UA Negative mg/dl      Ketones, UA Negative mg/dl      Urobilinogen, UA 0 2 E U /dl      Bilirubin, UA Negative     Occult Blood, UA Negative     Specific Gravity, UA 1 020    Narrative:      CLINITEK RESULT    Comprehensive metabolic panel [[id number]]  (Abnormal) Collected: 01/24/23 0806    Lab Status: Final result Specimen: Blood from Hand, Right Updated: 01/24/23 0840     Sodium 137 mmol/L      Potassium 3 7 mmol/L      Chloride 104 mmol/L      CO2 24 mmol/L      ANION GAP 9 mmol/L      BUN 8 mg/dL      Creatinine 0 74 mg/dL      Glucose 115 mg/dL      Calcium 9 5 mg/dL       U/L      ALT 57 U/L      Alkaline Phosphatase 141 U/L      Total Protein 7 7 g/dL      Albumin 3 9 g/dL      Total Bilirubin 0 75 mg/dL      eGFR 117 ml/min/1 73sq m     Narrative:      [place] guidelines for Chronic Kidney Disease (CKD):   •  Stage 1 with normal or high GFR (GFR > 90 mL/min/1 73 square meters)  •  Stage 2 Mild CKD (GFR = 60-89 mL/min/1 73 square meters)  •  Stage 3A Moderate CKD (GFR = 45-59 mL/min/1 73 square meters)  • Stage 3B Moderate CKD (GFR = 30-44 mL/min/1 73 square meters)  •  Stage 4 Severe CKD (GFR = 15-29 mL/min/1 73 square meters)  •  Stage 5 End Stage CKD (GFR <15 mL/min/1 73 square meters)  Note: GFR calculation is accurate only with a steady state creatinine    Lipase [[id number]]  (Abnormal) Collected: 01/24/23 0806    Lab Status: Final result Specimen: Blood from Hand, Right Updated: 01/24/23 0840     Lipase 10 u/L     CBC and differential [[id number]]  (Abnormal) Collected: 01/24/23 0806    Lab Status: Final result Specimen: Blood from Hand, Right Updated: 01/24/23 0823     WBC 12 27 Thousand/uL      RBC 4 60 Million/uL      Hemoglobin 11 2 g/dL      Hematocrit 36 7 %      MCV 80 fL      MCH 24 3 pg      MCHC 30 5 g/dL      RDW 14 3 %      MPV 10 7 fL      Platelets 115 Thousands/uL      nRBC 0 /100 WBCs      Neutrophils Relative 82 %      Immat GRANS % 0 %      Lymphocytes Relative 12 %      Monocytes Relative 5 %      Eosinophils Relative 0 %      Basophils Relative 1 %      Neutrophils Absolute 10 02 Thousands/µL      Immature Grans Absolute 0 05 Thousand/uL      Lymphocytes Absolute 1 47 Thousands/µL      Monocytes Absolute 0 62 Thousand/µL      Eosinophils Absolute 0 05 Thousand/µL      Basophils Absolute 0 06 Thousands/µL                  No orders to display              Procedures  Procedures         ED Course  ED Course as of 01/24/23 1046   Tue Jan 24, 2023   0839 WBC(!): 12 27  Mildly elevated   0846 AST(!): 225   0846 ALT(!): 57   0846 Alkaline Phosphatase(!): 141  Elevated  Consulting surgery  No evidence of pancreatitis as the lipase is not elevated  9156 Patient is feeling much better at the present time  I did discuss this with general surgery and they will make sure she gets outpatient follow-up this week or early next week as she will most likely need her gallbladder out now    Her pain is much better at this point and she has no rebound or guarding or any obvious tenderness on exam   She has Tylenol and Motrin and Zofran at home I will give her a few pills of Percocet just in case and instructed her that she needs to return for severe pain if it does not get better over the course of a few hours or if she has any fever or worse in any way  AKBAR    Flowsheet Row Most Recent Value   SBIRT (13-21 yo)    In order to provide better care to our patients, we are screening all of our patients for alcohol and drug use  Would it be okay to ask you these screening questions? Yes Filed at: 01/24/2023 0741   AKBAR Initial Screen: During the past 12 months, did you:    1  Drink any alcohol (more than a few sips)? No Filed at: 01/24/2023 0741   2  Smoke any marijuana or hashish No Filed at: 01/24/2023 0741   3  Use anything else to get high? ("anything else" includes illegal drugs, over the counter and prescription drugs, and things that you sniff or 'sanchez')? No Filed at: 01/24/2023 0741                                          Medical Decision Making  Patient's pain is much better at the present time  She was hydrated  Did have a mild elevation of the white count and mild elevation of the LFTs but no evidence of pancreatitis  Current symptomatic biliary colic  Surgical consult was obtained and she will need her gallbladder out but they feel that they can do this on an outpatient basis in an expedited manner  I did put in an ambulatory referral to general surgery for Dr Chris Garcia  I explained to the patient that she needs to return immediately if the pain becomes severe and is not going away despite pain medication or if she has a fever or if she is worse in any way  There were no issues when I checked the PDMP     1/24/2023 10:39 am: Surgery Dr Sebastian Whitley me back and stated that they wanted the patient on antibiotics  They were initially in the OR and missed my text  I discharged the patient but not with antibiotics  I called the number in the chart on her cell phone and left a message    I asked her to call me back  I sent a prescription for Augmentin to her pharmacy where I sent the pain medication  1046: The patient called me back and I let her know about the antibiotics  Biliary colic: acute illness or injury  Elevated LFTs: acute illness or injury  Gallstones: acute illness or injury  RUQ pain: acute illness or injury  Amount and/or Complexity of Data Reviewed  Labs: ordered  Decision-making details documented in ED Course  Risk  Prescription drug management  Disposition  Final diagnoses:   RUQ pain   Biliary colic   Gallstones   Elevated LFTs     Time reflects when diagnosis was documented in both MDM as applicable and the Disposition within this note     Time User Action Codes Description Comment    1/24/2023  8:53 AM Bobbi Her Add [R10 11] RUQ pain     1/24/2023  9:40 AM Bobbi Her Add [F67 28] Biliary colic     5/30/9576  3:20 AM Bobbi Her J Add [K80 20] Gallstones     1/24/2023  9:41 AM Bobbi Her Add [R79 89] Elevated LFTs       ED Disposition     ED Disposition   Discharge    Condition   Stable    Date/Time   Tue Jan 24, 2023  9:40 AM    Comment   Marquita Chin discharge to home/self care                 Follow-up Information     Follow up With Specialties Details Why Contact Yumiko Prajapati MD General Surgery, Wound Care Schedule an appointment as soon as possible for a visit in 1 week  [address] [place] [address] [address] [facility]  [phone]            Discharge Medication List as of 1/24/2023  9:45 AM      START taking these medications    Details   oxyCODONE-acetaminophen (PERCOCET) 5-325 mg per tablet Take 1 tablet by mouth every 8 (eight) hours as needed for severe pain for up to 5 doses Max Daily Amount: 3 tablets, Starting Tue 1/24/2023, Normal         CONTINUE these medications which have NOT CHANGED    Details   docusate sodium (COLACE) 50 mg capsule Take 1 capsule (50 mg total) by mouth 2 (two) times a day, Starting Wed 12/14/2022, No Print      ibuprofen (MOTRIN) 600 mg tablet Take 1 tablet (600 mg total) by mouth every 6 (six) hours, Starting Wed 12/14/2022, Normal      naproxen (Naprosyn) 500 mg tablet Take 1 tablet (500 mg total) by mouth 2 (two) times a day with meals, Starting Sun 1/1/2023, Normal      ondansetron (ZOFRAN-ODT) 4 mg disintegrating tablet Take 1 tablet (4 mg total) by mouth every 6 (six) hours as needed for nausea or vomiting, Starting Sun 1/1/2023, Normal      pantoprazole (PROTONIX) 40 mg tablet Take 1 tablet (40 mg total) by mouth daily, Starting Sun 1/1/2023, Normal                 PDMP Review     None          ED Provider  Electronically Signed by           Kim Cross MD  01/24/23 Ajay Carcamo MD  01/24/23 294 David Dukes MD  01/24/23 8928

## 2023-02-03 ENCOUNTER — HOSPITAL ENCOUNTER (EMERGENCY)
Facility: HOSPITAL | Age: 18
Discharge: HOME | End: 2023-02-04
Attending: EMERGENCY MEDICINE

## 2023-02-03 DIAGNOSIS — K80.50 BILIARY COLIC: ICD-10-CM

## 2023-02-03 DIAGNOSIS — K80.20 CHOLELITHIASIS: Primary | ICD-10-CM

## 2023-02-03 LAB
BASOPHILS # BLD AUTO: 0.06 THOUSANDS/ÂΜL (ref 0–0.1)
BASOPHILS NFR BLD AUTO: 0 % (ref 0–1)
EOSINOPHIL # BLD AUTO: 0.03 THOUSAND/ÂΜL (ref 0–0.61)
EOSINOPHIL NFR BLD AUTO: 0 % (ref 0–6)
ERYTHROCYTE [DISTWIDTH] IN BLOOD BY AUTOMATED COUNT: 14.2 % (ref 11.6–15.1)
HCT VFR BLD AUTO: 39.5 % (ref 34.8–46.1)
HGB BLD-MCNC: 11.9 G/DL (ref 11.5–15.4)
IMM GRANULOCYTES # BLD AUTO: 0.19 THOUSAND/UL (ref 0–0.2)
IMM GRANULOCYTES NFR BLD AUTO: 1 % (ref 0–2)
LYMPHOCYTES # BLD AUTO: 2.03 THOUSANDS/ÂΜL (ref 0.6–4.47)
LYMPHOCYTES NFR BLD AUTO: 12 % (ref 14–44)
MCH RBC QN AUTO: 24 PG (ref 26.8–34.3)
MCHC RBC AUTO-ENTMCNC: 30.1 G/DL (ref 31.4–37.4)
MCV RBC AUTO: 80 FL (ref 82–98)
MONOCYTES # BLD AUTO: 0.83 THOUSAND/ÂΜL (ref 0.17–1.22)
MONOCYTES NFR BLD AUTO: 5 % (ref 4–12)
NEUTROPHILS # BLD AUTO: 13.64 THOUSANDS/ÂΜL (ref 1.85–7.62)
NEUTS SEG NFR BLD AUTO: 82 % (ref 43–75)
NRBC BLD AUTO-RTO: 0 /100 WBCS
PLATELET # BLD AUTO: 372 THOUSANDS/UL (ref 149–390)
PMV BLD AUTO: 11.7 FL (ref 8.9–12.7)
RBC # BLD AUTO: 4.95 MILLION/UL (ref 3.81–5.12)
WBC # BLD AUTO: 16.78 THOUSAND/UL (ref 4.31–10.16)

## 2023-02-03 RX ORDER — MORPHINE SULFATE 4 MG/ML
6 INJECTION, SOLUTION INTRAMUSCULAR; INTRAVENOUS ONCE
Status: COMPLETED | OUTPATIENT
Start: 2023-02-03 | End: 2023-02-03

## 2023-02-03 RX ORDER — KETOROLAC TROMETHAMINE 30 MG/ML
15 INJECTION, SOLUTION INTRAMUSCULAR; INTRAVENOUS ONCE
Status: DISCONTINUED | OUTPATIENT
Start: 2023-02-03 | End: 2023-02-04 | Stop reason: HOSPADM

## 2023-02-03 RX ORDER — ONDANSETRON 2 MG/ML
4 INJECTION INTRAMUSCULAR; INTRAVENOUS ONCE
Status: COMPLETED | OUTPATIENT
Start: 2023-02-03 | End: 2023-02-03

## 2023-02-03 RX ADMIN — MORPHINE SULFATE 6 MG: 4 INJECTION INTRAVENOUS at 23:25

## 2023-02-03 RX ADMIN — ONDANSETRON HYDROCHLORIDE 4 MG: 2 SOLUTION INTRAMUSCULAR; INTRAVENOUS at 23:25

## 2023-02-03 NOTE — Clinical Note
Angeli Ashley was seen and treated in our emergency department on 2/3/2023  No restrictions            Diagnosis:     Chela Washburn  may return to work on return date  She may return on this date: 02/05/2023         If you have any questions or concerns, please don't hesitate to call        Jarred Javier MD    ______________________________           _______________          _______________  Hospital Representative                              Date                                Time

## 2023-02-04 VITALS
OXYGEN SATURATION: 99 % | SYSTOLIC BLOOD PRESSURE: 118 MMHG | BODY MASS INDEX: 34.27 KG/M2 | TEMPERATURE: 97.9 F | HEART RATE: 61 BPM | DIASTOLIC BLOOD PRESSURE: 70 MMHG | RESPIRATION RATE: 18 BRPM | WEIGHT: 212.3 LBS

## 2023-02-04 LAB
ALBUMIN SERPL BCP-MCNC: 4.5 G/DL (ref 3.5–5)
ALP SERPL-CCNC: 100 U/L (ref 34–104)
ALT SERPL W P-5'-P-CCNC: 14 U/L (ref 7–52)
ANION GAP SERPL CALCULATED.3IONS-SCNC: 11 MMOL/L (ref 4–13)
APTT PPP: 21 SECONDS (ref 23–37)
AST SERPL W P-5'-P-CCNC: 34 U/L (ref 13–39)
BILIRUB SERPL-MCNC: 0.46 MG/DL (ref 0.2–1)
BUN SERPL-MCNC: 7 MG/DL (ref 5–25)
CALCIUM SERPL-MCNC: 10.7 MG/DL (ref 8.4–10.2)
CHLORIDE SERPL-SCNC: 100 MMOL/L (ref 96–108)
CO2 SERPL-SCNC: 25 MMOL/L (ref 21–32)
CREAT SERPL-MCNC: 0.73 MG/DL (ref 0.6–1.3)
GFR SERPL CREATININE-BSD FRML MDRD: 119 ML/MIN/1.73SQ M
GLUCOSE SERPL-MCNC: 108 MG/DL (ref 65–140)
INR PPP: 0.93 (ref 0.84–1.19)
LIPASE SERPL-CCNC: 13 U/L (ref 11–82)
POTASSIUM SERPL-SCNC: 3.3 MMOL/L (ref 3.5–5.3)
PROT SERPL-MCNC: 8.5 G/DL (ref 6.4–8.4)
PROTHROMBIN TIME: 12.4 SECONDS (ref 11.6–14.5)
SODIUM SERPL-SCNC: 136 MMOL/L (ref 135–147)

## 2023-02-04 RX ORDER — OXYCODONE HYDROCHLORIDE 5 MG/1
5 TABLET ORAL EVERY 4 HOURS PRN
Qty: 10 TABLET | Refills: 0 | Status: SHIPPED | OUTPATIENT
Start: 2023-02-04 | End: 2023-02-06

## 2023-02-04 NOTE — ED PROVIDER NOTES
History  Chief Complaint   Patient presents with   • Abdominal Pain     Hx of gull stones with increased pain     This is a 78-year-old female with a history of known cholelithiasis who presents with epigastric abdominal pain  Symptoms started around 6 PM while at work  Pain is sharp, epigastric, radiating to her back, associated with nausea without vomiting  She has not taken anything for the pain  States that she does have outpatient follow-up with surgery in about 2 weeks  Prior to Admission Medications   Prescriptions Last Dose Informant Patient Reported? Taking?   docusate sodium (COLACE) 50 mg capsule   No No   Sig: Take 1 capsule (50 mg total) by mouth 2 (two) times a day   ibuprofen (MOTRIN) 600 mg tablet   No No   Sig: Take 1 tablet (600 mg total) by mouth every 6 (six) hours   Patient not taking: Reported on 2022   naproxen (Naprosyn) 500 mg tablet   No No   Sig: Take 1 tablet (500 mg total) by mouth 2 (two) times a day with meals   ondansetron (ZOFRAN-ODT) 4 mg disintegrating tablet   No No   Sig: Take 1 tablet (4 mg total) by mouth every 6 (six) hours as needed for nausea or vomiting   oxyCODONE-acetaminophen (PERCOCET) 5-325 mg per tablet   No No   Sig: Take 1 tablet by mouth every 8 (eight) hours as needed for severe pain for up to 5 doses Max Daily Amount: 3 tablets   pantoprazole (PROTONIX) 40 mg tablet   No No   Sig: Take 1 tablet (40 mg total) by mouth daily      Facility-Administered Medications: None       Past Medical History:   Diagnosis Date   • Obesity (BMI 35 0-39 9 without comorbidity) 2022       Past Surgical History:   Procedure Laterality Date   • MYRINGOTOMY W/ TUBES     • [place]  DELIVERY ONLY N/A 12/10/2022    Procedure:  SECTION ();   Surgeon: Kulwinder Gibbons DO;  Location: [facility];  Service: Obstetrics       Family History   Problem Relation Age of Onset   • Hyperlipidemia Mother    • No Known Problems Father      I have reviewed and agree with the history as documented  E-Cigarette/Vaping   • E-Cigarette Use Current Some Day User      E-Cigarette/Vaping Substances   • Nicotine Yes      Social History     Tobacco Use   • Smoking status: Never   • Smokeless tobacco: Never   Vaping Use   • Vaping Use: Some days   • Substances: Nicotine   Substance Use Topics   • Alcohol use: Never   • Drug use: Never       Review of Systems   Constitutional: Negative for chills and fever  HENT: Negative for rhinorrhea, sore throat and trouble swallowing  Eyes: Negative for photophobia and visual disturbance  Respiratory: Negative for cough, chest tightness and shortness of breath  Cardiovascular: Negative for chest pain, palpitations and leg swelling  Gastrointestinal: Positive for abdominal pain and nausea  Negative for blood in stool, diarrhea and vomiting  Endocrine: Negative for polyuria  Genitourinary: Negative for dysuria, flank pain, hematuria, vaginal bleeding and vaginal discharge  Musculoskeletal: Negative for back pain and neck pain  Skin: Negative for color change and rash  Allergic/Immunologic: Negative for immunocompromised state  Neurological: Negative for dizziness, weakness, light-headedness, numbness and headaches  All other systems reviewed and are negative  Physical Exam  Physical Exam  Vitals and nursing note reviewed  Constitutional:       General: She is not in acute distress  Appearance: She is well-developed  Comments: Appears uncomfortable  HENT:      Head: Normocephalic and atraumatic  Mouth/Throat:      Lips: Pink  Mouth: Mucous membranes are moist    Eyes:      General: Lids are normal       Extraocular Movements: Extraocular movements intact  Conjunctiva/sclera: Conjunctivae normal       Pupils: Pupils are equal, round, and reactive to light  Cardiovascular:      Rate and Rhythm: Normal rate and regular rhythm  Heart sounds: Normal heart sounds   No murmur heard   Pulmonary:      Effort: Pulmonary effort is normal       Breath sounds: Normal breath sounds  Abdominal:      General: There is no distension  Palpations: Abdomen is soft  Tenderness: There is abdominal tenderness in the right upper quadrant and epigastric area  There is no guarding or rebound  Musculoskeletal:         General: No swelling  Cervical back: Full passive range of motion without pain, normal range of motion and neck supple  Skin:     General: Skin is warm  Capillary Refill: Capillary refill takes less than 2 seconds  Neurological:      General: No focal deficit present  Mental Status: She is alert     Psychiatric:         Mood and Affect: Mood normal          Speech: Speech normal          Behavior: Behavior normal          Vital Signs  ED Triage Vitals [02/03/23 2154]   Temperature Pulse Respirations Blood Pressure SpO2   97 9 °F (36 6 °C) 83 18 167/79 100 %      Temp Source Heart Rate Source Patient Position - Orthostatic VS BP Location FiO2 (%)   Oral Monitor Sitting Right arm --      Pain Score       10 - Worst Possible Pain           Vitals:    02/03/23 2154 02/03/23 2352 02/04/23 0000   BP: 167/79 151/66 151/66   Pulse: 83 81 56   Patient Position - Orthostatic VS: Sitting Sitting          Visual Acuity      ED Medications  Medications   ketorolac (TORADOL) injection 15 mg (0 mg Intravenous Hold 2/3/23 2350)   ondansetron (ZOFRAN) injection 4 mg (4 mg Intravenous Given 2/3/23 2325)   morphine injection 6 mg (6 mg Intravenous Given 2/3/23 2325)       Diagnostic Studies  Results Reviewed     Procedure Component Value Units Date/Time    Protime-INR [[id number]]  (Normal) Collected: 02/03/23 2345    Lab Status: Final result Specimen: Blood from Arm, Right Updated: 02/04/23 0012     Protime 12 4 seconds      INR 0 93    APTT [[id number]]  (Abnormal) Collected: 02/03/23 2345    Lab Status: Final result Specimen: Blood from Arm, Right Updated: 02/04/23 0012     PTT 21 seconds     Comprehensive metabolic panel [[id number]]  (Abnormal) Collected: 02/03/23 2345    Lab Status: Final result Specimen: Blood from Arm, Right Updated: 02/04/23 0007     Sodium 136 mmol/L      Potassium 3 3 mmol/L      Chloride 100 mmol/L      CO2 25 mmol/L      ANION GAP 11 mmol/L      BUN 7 mg/dL      Creatinine 0 73 mg/dL      Glucose 108 mg/dL      Calcium 10 7 mg/dL      AST 34 U/L      ALT 14 U/L      Alkaline Phosphatase 100 U/L      Total Protein 8 5 g/dL      Albumin 4 5 g/dL      Total Bilirubin 0 46 mg/dL      eGFR 119 ml/min/1 73sq m     Narrative:      National Kidney Disease Foundation guidelines for Chronic Kidney Disease (CKD):   •  Stage 1 with normal or high GFR (GFR > 90 mL/min/1 73 square meters)  •  Stage 2 Mild CKD (GFR = 60-89 mL/min/1 73 square meters)  •  Stage 3A Moderate CKD (GFR = 45-59 mL/min/1 73 square meters)  •  Stage 3B Moderate CKD (GFR = 30-44 mL/min/1 73 square meters)  •  Stage 4 Severe CKD (GFR = 15-29 mL/min/1 73 square meters)  •  Stage 5 End Stage CKD (GFR <15 mL/min/1 73 square meters)  Note: GFR calculation is accurate only with a steady state creatinine    Lipase [[id number]]  (Normal) Collected: 02/03/23 2345    Lab Status: Final result Specimen: Blood from Arm, Right Updated: 02/04/23 0007     Lipase 13 u/L     CBC and differential [[id number]]  (Abnormal) Collected: 02/03/23 2345    Lab Status: Final result Specimen: Blood from Arm, Right Updated: 02/03/23 2357     WBC 16 78 Thousand/uL      RBC 4 95 Million/uL      Hemoglobin 11 9 g/dL      Hematocrit 39 5 %      MCV 80 fL      MCH 24 0 pg      MCHC 30 1 g/dL      RDW 14 2 %      MPV 11 7 fL      Platelets 517 Thousands/uL      nRBC 0 /100 WBCs      Neutrophils Relative 82 %      Immat GRANS % 1 %      Lymphocytes Relative 12 %      Monocytes Relative 5 %      Eosinophils Relative 0 %      Basophils Relative 0 %      Neutrophils Absolute 13 64 Thousands/µL      Immature Grans Absolute 0 19 Thousand/uL Lymphocytes Absolute 2 03 Thousands/µL      Monocytes Absolute 0 83 Thousand/µL      Eosinophils Absolute 0 03 Thousand/µL      Basophils Absolute 0 06 Thousands/µL     POCT pregnancy, urine [[id number]]     Lab Status: No result                  No orders to display              Procedures  POC Biliary US    Date/Time: 2/3/2023 10:57 PM  Performed by: Emily Severe, MD  Authorized by: Emily Severe, MD     Patient location:  ED  Performed by: Attending  Other Assisting Provider: No    Procedure details:     Exam Type:  Diagnostic    Indications: upper right quadrant abdominal pain and epigastric pain      Assessment for:  Cholecystitis and cholelithiasis    Views obtained: gallbladder (transverse and longitudinal)      Image quality: limited diagnostic      Image availability:  Still images obtained  Findings:     Cholelithiasis: identified      Common bile duct:  Unable to visualize    Gallbladder wall:  Normal    Gallbladder wall thickness (mm):  2    Pericholecystic fluid: not identified      Sonographic Kwon's sign: positive      Polyps: not identified      Mass: not identified    Interpretation:     Biliary ultrasound impressions: cholelithiasis                           ED Course  ED Course as of 02/04/23 0108   Sat Feb 04, 2023   0014 TT sent to surgery resident given repeat visits and earliest appointment she could get was in 2 weeks  7395 Resident will come to evaluate   0100 Patient offered cholecystectomy tomorrow by surgery  However, patient declining at this time  Will discharge  CRAFFT    Flowsheet Row Most Recent Value   SBIRT (13-21 yo)    In order to provide better care to our patients, we are screening all of our patients for alcohol and drug use  Would it be okay to ask you these screening questions? Yes Filed at: 02/03/2023 3210   CRALARY Initial Screen: During the past 12 months, did you:    1  Drink any alcohol (more than a few sips)? No Filed at: 02/03/2023 3356   2   Smoke any marijuana or hashish No Filed at: 02/03/2023 6376   3  Use anything else to get high? ("anything else" includes illegal drugs, over the counter and prescription drugs, and things that you sniff or 'sanchez')? No Filed at: 02/03/2023 2316                                          Medical Decision Making  Given confirmed history of cholelithiasis and recent visits for similar pain, symptoms likely related to gallbladder  Biliary colic versus cholecystitis versus gallstone pancreatitis  Plan to check bedside ultrasound of the gallbladder to rule out cholecystitis  CBC to rule out evidence of infection  CMP to check LFTs in case of cholecystitis  Lipase to rule out gallstone pancreatitis  We will treat her pain with Toradol and morphine  Treat her nausea with Zofran  Biliary colic: complicated acute illness or injury with systemic symptoms  Cholelithiasis: acute illness or injury  Amount and/or Complexity of Data Reviewed  External Data Reviewed: labs, radiology and notes  Details: Previous ER visits  Recent surgery consult  Labs: ordered  Risk  Prescription drug management  Disposition  Final diagnoses:   Cholelithiasis   Biliary colic     Time reflects when diagnosis was documented in both MDM as applicable and the Disposition within this note     Time User Action Codes Description Comment    2/4/2023 12:35 AM Amy Lazo Add [K80 20] Cholelithiasis     2/4/2023  1:01 AM Amy Lazo Add [W26 79] Biliary colic       ED Disposition     ED Disposition   Discharge    Condition   Stable    Date/Time   Sat Feb 4, 2023  1:01 AM    Comment   Avila Chin discharge to home/self care                 Follow-up Information     Follow up With Specialties Details Why Contact Info Additional Information    Kamini Maciel MD General Surgery, Wound Care Go to  your scheduled appointment [address] [place] [address] [address] [address]       [address] [facility] Emergency Department Emergency Medicine Go to  If symptoms worsen [facility] [phone]  [address] [facility] Emergency Department, [address]  , [place], [place], [address]          Patient's Medications   Discharge Prescriptions    OXYCODONE (ROXICODONE) 5 IMMEDIATE RELEASE TABLET    Take 1 tablet (5 mg total) by mouth every 4 (four) hours as needed for moderate pain for up to 2 days Max Daily Amount: 30 mg       Start Date: 2/4/2023  End Date: 2/6/2023       Order Dose: 5 mg       Quantity: 10 tablet    Refills: 0       No discharge procedures on file      PDMP Review     None          ED Provider  Electronically Signed by           Mansoor Spears MD  02/04/23 4521

## 2023-02-07 PROBLEM — O26.899 CRAMPING AFFECTING PREGNANCY, ANTEPARTUM: Status: RESOLVED | Noted: 2022-12-06 | Resolved: 2023-02-07

## 2023-02-07 PROBLEM — R10.9 CRAMPING AFFECTING PREGNANCY, ANTEPARTUM: Status: RESOLVED | Noted: 2022-12-06 | Resolved: 2023-02-07

## 2023-02-07 PROBLEM — O13.3 GESTATIONAL HYPERTENSION, THIRD TRIMESTER: Status: RESOLVED | Noted: 2022-12-09 | Resolved: 2023-02-07

## 2023-02-08 ENCOUNTER — HOSPITAL ENCOUNTER (OUTPATIENT)
Facility: HOSPITAL | Age: 18
Setting detail: OUTPATIENT SURGERY
Discharge: HOME | End: 2023-02-11
Attending: EMERGENCY MEDICINE | Admitting: SURGERY

## 2023-02-08 ENCOUNTER — APPOINTMENT (EMERGENCY)
Dept: ULTRASOUND IMAGING | Facility: HOSPITAL | Age: 18
End: 2023-02-08

## 2023-02-08 DIAGNOSIS — Z90.49 S/P LAPAROSCOPIC CHOLECYSTECTOMY: ICD-10-CM

## 2023-02-08 DIAGNOSIS — K80.50 BILIARY COLIC: ICD-10-CM

## 2023-02-08 DIAGNOSIS — R10.9 ABDOMINAL PAIN: ICD-10-CM

## 2023-02-08 DIAGNOSIS — K80.20 CHOLELITHIASIS: Primary | ICD-10-CM

## 2023-02-08 DIAGNOSIS — K80.50 CHOLEDOCHOLITHIASIS: ICD-10-CM

## 2023-02-08 LAB
ALBUMIN SERPL BCP-MCNC: 4.4 G/DL (ref 3.5–5)
ALP SERPL-CCNC: 173 U/L (ref 34–104)
ALT SERPL W P-5'-P-CCNC: 49 U/L (ref 7–52)
ANION GAP SERPL CALCULATED.3IONS-SCNC: 10 MMOL/L (ref 4–13)
APTT PPP: 31 SECONDS (ref 23–37)
AST SERPL W P-5'-P-CCNC: 135 U/L (ref 13–39)
BASOPHILS # BLD AUTO: 0.05 THOUSANDS/ÂΜL (ref 0–0.1)
BASOPHILS NFR BLD AUTO: 0 % (ref 0–1)
BILIRUB SERPL-MCNC: 0.66 MG/DL (ref 0.2–1)
BILIRUB UR QL STRIP: NEGATIVE
BUN SERPL-MCNC: 8 MG/DL (ref 5–25)
CALCIUM SERPL-MCNC: 9.8 MG/DL (ref 8.4–10.2)
CHLORIDE SERPL-SCNC: 104 MMOL/L (ref 96–108)
CLARITY UR: CLEAR
CO2 SERPL-SCNC: 22 MMOL/L (ref 21–32)
COLOR UR: YELLOW
CREAT SERPL-MCNC: 0.69 MG/DL (ref 0.6–1.3)
EOSINOPHIL # BLD AUTO: 0 THOUSAND/ÂΜL (ref 0–0.61)
EOSINOPHIL NFR BLD AUTO: 0 % (ref 0–6)
ERYTHROCYTE [DISTWIDTH] IN BLOOD BY AUTOMATED COUNT: 14.1 % (ref 11.6–15.1)
EXT PREGNANCY TEST URINE: NEGATIVE
EXT. CONTROL: NORMAL
GFR SERPL CREATININE-BSD FRML MDRD: 126 ML/MIN/1.73SQ M
GLUCOSE SERPL-MCNC: 108 MG/DL (ref 65–140)
GLUCOSE UR STRIP-MCNC: NEGATIVE MG/DL
HCT VFR BLD AUTO: 39.6 % (ref 34.8–46.1)
HGB BLD-MCNC: 12.1 G/DL (ref 11.5–15.4)
HGB UR QL STRIP.AUTO: NEGATIVE
IMM GRANULOCYTES # BLD AUTO: 0.05 THOUSAND/UL (ref 0–0.2)
IMM GRANULOCYTES NFR BLD AUTO: 0 % (ref 0–2)
INR PPP: 1.03 (ref 0.84–1.19)
KETONES UR STRIP-MCNC: NEGATIVE MG/DL
LACTATE SERPL-SCNC: 1.2 MMOL/L (ref 0.5–2)
LEUKOCYTE ESTERASE UR QL STRIP: NEGATIVE
LIPASE SERPL-CCNC: 20 U/L (ref 11–82)
LYMPHOCYTES # BLD AUTO: 1.46 THOUSANDS/ÂΜL (ref 0.6–4.47)
LYMPHOCYTES NFR BLD AUTO: 10 % (ref 14–44)
MAGNESIUM SERPL-MCNC: 2 MG/DL (ref 1.9–2.7)
MCH RBC QN AUTO: 24.7 PG (ref 26.8–34.3)
MCHC RBC AUTO-ENTMCNC: 30.6 G/DL (ref 31.4–37.4)
MCV RBC AUTO: 81 FL (ref 82–98)
MONOCYTES # BLD AUTO: 0.45 THOUSAND/ÂΜL (ref 0.17–1.22)
MONOCYTES NFR BLD AUTO: 3 % (ref 4–12)
NEUTROPHILS # BLD AUTO: 12.8 THOUSANDS/ÂΜL (ref 1.85–7.62)
NEUTS SEG NFR BLD AUTO: 87 % (ref 43–75)
NITRITE UR QL STRIP: NEGATIVE
NRBC BLD AUTO-RTO: 0 /100 WBCS
PH UR STRIP.AUTO: 6.5 [PH] (ref 4.5–8)
PLATELET # BLD AUTO: 345 THOUSANDS/UL (ref 149–390)
PMV BLD AUTO: 10.7 FL (ref 8.9–12.7)
POTASSIUM SERPL-SCNC: 4.1 MMOL/L (ref 3.5–5.3)
PROT SERPL-MCNC: 8 G/DL (ref 6.4–8.4)
PROT UR STRIP-MCNC: NEGATIVE MG/DL
PROTHROMBIN TIME: 13.5 SECONDS (ref 11.6–14.5)
RBC # BLD AUTO: 4.9 MILLION/UL (ref 3.81–5.12)
SODIUM SERPL-SCNC: 136 MMOL/L (ref 135–147)
SP GR UR STRIP.AUTO: 1.02 (ref 1–1.03)
UROBILINOGEN UR QL STRIP.AUTO: 0.2 E.U./DL
WBC # BLD AUTO: 14.81 THOUSAND/UL (ref 4.31–10.16)

## 2023-02-08 RX ORDER — FENTANYL CITRATE 50 UG/ML
50 INJECTION, SOLUTION INTRAMUSCULAR; INTRAVENOUS ONCE
Status: COMPLETED | OUTPATIENT
Start: 2023-02-08 | End: 2023-02-08

## 2023-02-08 RX ORDER — ONDANSETRON 2 MG/ML
4 INJECTION INTRAMUSCULAR; INTRAVENOUS ONCE
Status: COMPLETED | OUTPATIENT
Start: 2023-02-08 | End: 2023-02-08

## 2023-02-08 RX ADMIN — ONDANSETRON HYDROCHLORIDE 4 MG: 2 SOLUTION INTRAMUSCULAR; INTRAVENOUS at 21:42

## 2023-02-08 RX ADMIN — FENTANYL CITRATE 50 MCG: 50 INJECTION, SOLUTION INTRAMUSCULAR; INTRAVENOUS at 21:44

## 2023-02-09 ENCOUNTER — ANESTHESIA (OUTPATIENT)
Dept: PERIOP | Facility: HOSPITAL | Age: 18
End: 2023-02-09

## 2023-02-09 ENCOUNTER — APPOINTMENT (OUTPATIENT)
Dept: RADIOLOGY | Facility: HOSPITAL | Age: 18
End: 2023-02-09

## 2023-02-09 ENCOUNTER — ANESTHESIA EVENT (OUTPATIENT)
Dept: PERIOP | Facility: HOSPITAL | Age: 18
End: 2023-02-09

## 2023-02-09 PROBLEM — K80.50 RECURRENT BILIARY COLIC: Status: ACTIVE | Noted: 2023-02-09

## 2023-02-09 LAB
ABO GROUP BLD: NORMAL
ALBUMIN SERPL BCP-MCNC: 3.7 G/DL (ref 3.5–5)
ALP SERPL-CCNC: 144 U/L (ref 34–104)
ALT SERPL W P-5'-P-CCNC: 68 U/L (ref 7–52)
ANION GAP SERPL CALCULATED.3IONS-SCNC: 6 MMOL/L (ref 4–13)
AST SERPL W P-5'-P-CCNC: 163 U/L (ref 13–39)
BASOPHILS # BLD AUTO: 0.05 THOUSANDS/ÂΜL (ref 0–0.1)
BASOPHILS NFR BLD AUTO: 1 % (ref 0–1)
BILIRUB SERPL-MCNC: 0.66 MG/DL (ref 0.2–1)
BLD GP AB SCN SERPL QL: NEGATIVE
BUN SERPL-MCNC: 6 MG/DL (ref 5–25)
CALCIUM SERPL-MCNC: 9.2 MG/DL (ref 8.4–10.2)
CHLORIDE SERPL-SCNC: 105 MMOL/L (ref 96–108)
CO2 SERPL-SCNC: 26 MMOL/L (ref 21–32)
CREAT SERPL-MCNC: 0.74 MG/DL (ref 0.6–1.3)
EOSINOPHIL # BLD AUTO: 0.04 THOUSAND/ÂΜL (ref 0–0.61)
EOSINOPHIL NFR BLD AUTO: 0 % (ref 0–6)
ERYTHROCYTE [DISTWIDTH] IN BLOOD BY AUTOMATED COUNT: 14.2 % (ref 11.6–15.1)
FLUAV RNA RESP QL NAA+PROBE: NEGATIVE
FLUBV RNA RESP QL NAA+PROBE: NEGATIVE
GFR SERPL CREATININE-BSD FRML MDRD: 117 ML/MIN/1.73SQ M
GLUCOSE P FAST SERPL-MCNC: 97 MG/DL (ref 65–99)
GLUCOSE SERPL-MCNC: 97 MG/DL (ref 65–140)
HCT VFR BLD AUTO: 35.8 % (ref 34.8–46.1)
HGB BLD-MCNC: 11 G/DL (ref 11.5–15.4)
IMM GRANULOCYTES # BLD AUTO: 0.05 THOUSAND/UL (ref 0–0.2)
IMM GRANULOCYTES NFR BLD AUTO: 1 % (ref 0–2)
LYMPHOCYTES # BLD AUTO: 2.4 THOUSANDS/ÂΜL (ref 0.6–4.47)
LYMPHOCYTES NFR BLD AUTO: 23 % (ref 14–44)
MCH RBC QN AUTO: 24.3 PG (ref 26.8–34.3)
MCHC RBC AUTO-ENTMCNC: 30.7 G/DL (ref 31.4–37.4)
MCV RBC AUTO: 79 FL (ref 82–98)
MONOCYTES # BLD AUTO: 0.81 THOUSAND/ÂΜL (ref 0.17–1.22)
MONOCYTES NFR BLD AUTO: 8 % (ref 4–12)
NEUTROPHILS # BLD AUTO: 7.26 THOUSANDS/ÂΜL (ref 1.85–7.62)
NEUTS SEG NFR BLD AUTO: 67 % (ref 43–75)
NRBC BLD AUTO-RTO: 0 /100 WBCS
PLATELET # BLD AUTO: 338 THOUSANDS/UL (ref 149–390)
PMV BLD AUTO: 11.4 FL (ref 8.9–12.7)
POTASSIUM SERPL-SCNC: 3.7 MMOL/L (ref 3.5–5.3)
PROT SERPL-MCNC: 6.9 G/DL (ref 6.4–8.4)
RBC # BLD AUTO: 4.53 MILLION/UL (ref 3.81–5.12)
RH BLD: POSITIVE
RSV RNA RESP QL NAA+PROBE: NEGATIVE
SARS-COV-2 RNA RESP QL NAA+PROBE: NEGATIVE
SODIUM SERPL-SCNC: 137 MMOL/L (ref 135–147)
SPECIMEN EXPIRATION DATE: NORMAL
WBC # BLD AUTO: 10.61 THOUSAND/UL (ref 4.31–10.16)

## 2023-02-09 RX ORDER — ENOXAPARIN SODIUM 100 MG/ML
40 INJECTION SUBCUTANEOUS DAILY
Status: DISCONTINUED | OUTPATIENT
Start: 2023-02-09 | End: 2023-02-11 | Stop reason: HOSPADM

## 2023-02-09 RX ORDER — DEXAMETHASONE SODIUM PHOSPHATE 10 MG/ML
INJECTION, SOLUTION INTRAMUSCULAR; INTRAVENOUS AS NEEDED
Status: DISCONTINUED | OUTPATIENT
Start: 2023-02-09 | End: 2023-02-09

## 2023-02-09 RX ORDER — ROCURONIUM BROMIDE 10 MG/ML
INJECTION, SOLUTION INTRAVENOUS AS NEEDED
Status: DISCONTINUED | OUTPATIENT
Start: 2023-02-09 | End: 2023-02-09

## 2023-02-09 RX ORDER — ONDANSETRON 2 MG/ML
4 INJECTION INTRAMUSCULAR; INTRAVENOUS EVERY 6 HOURS PRN
Status: DISCONTINUED | OUTPATIENT
Start: 2023-02-09 | End: 2023-02-11 | Stop reason: HOSPADM

## 2023-02-09 RX ORDER — MAGNESIUM HYDROXIDE 1200 MG/15ML
LIQUID ORAL AS NEEDED
Status: DISCONTINUED | OUTPATIENT
Start: 2023-02-09 | End: 2023-02-09 | Stop reason: HOSPADM

## 2023-02-09 RX ORDER — SODIUM CHLORIDE, SODIUM LACTATE, POTASSIUM CHLORIDE, CALCIUM CHLORIDE 600; 310; 30; 20 MG/100ML; MG/100ML; MG/100ML; MG/100ML
125 INJECTION, SOLUTION INTRAVENOUS CONTINUOUS
Status: DISPENSED | OUTPATIENT
Start: 2023-02-09 | End: 2023-02-11

## 2023-02-09 RX ORDER — CEFAZOLIN SODIUM 2 G/50ML
2000 SOLUTION INTRAVENOUS EVERY 8 HOURS
Status: DISCONTINUED | OUTPATIENT
Start: 2023-02-09 | End: 2023-02-10

## 2023-02-09 RX ORDER — METRONIDAZOLE 500 MG/100ML
500 INJECTION, SOLUTION INTRAVENOUS EVERY 8 HOURS
Status: DISCONTINUED | OUTPATIENT
Start: 2023-02-09 | End: 2023-02-10

## 2023-02-09 RX ORDER — GLYCOPYRROLATE 0.2 MG/ML
INJECTION INTRAMUSCULAR; INTRAVENOUS AS NEEDED
Status: DISCONTINUED | OUTPATIENT
Start: 2023-02-09 | End: 2023-02-09

## 2023-02-09 RX ORDER — MIDAZOLAM HYDROCHLORIDE 2 MG/2ML
INJECTION, SOLUTION INTRAMUSCULAR; INTRAVENOUS AS NEEDED
Status: DISCONTINUED | OUTPATIENT
Start: 2023-02-09 | End: 2023-02-09

## 2023-02-09 RX ORDER — FENTANYL CITRATE/PF 50 MCG/ML
50 SYRINGE (ML) INJECTION
Status: DISCONTINUED | OUTPATIENT
Start: 2023-02-09 | End: 2023-02-09 | Stop reason: HOSPADM

## 2023-02-09 RX ORDER — PROPOFOL 10 MG/ML
INJECTION, EMULSION INTRAVENOUS AS NEEDED
Status: DISCONTINUED | OUTPATIENT
Start: 2023-02-09 | End: 2023-02-09

## 2023-02-09 RX ORDER — OXYCODONE HYDROCHLORIDE 5 MG/1
5 TABLET ORAL EVERY 4 HOURS PRN
Status: DISCONTINUED | OUTPATIENT
Start: 2023-02-09 | End: 2023-02-11 | Stop reason: HOSPADM

## 2023-02-09 RX ORDER — OXYCODONE HYDROCHLORIDE 10 MG/1
10 TABLET ORAL EVERY 4 HOURS PRN
Status: DISCONTINUED | OUTPATIENT
Start: 2023-02-09 | End: 2023-02-11 | Stop reason: HOSPADM

## 2023-02-09 RX ORDER — FENTANYL CITRATE 50 UG/ML
INJECTION, SOLUTION INTRAMUSCULAR; INTRAVENOUS AS NEEDED
Status: DISCONTINUED | OUTPATIENT
Start: 2023-02-09 | End: 2023-02-09

## 2023-02-09 RX ORDER — ACETAMINOPHEN 325 MG/1
650 TABLET ORAL EVERY 6 HOURS PRN
Refills: 0 | Status: CANCELLED
Start: 2023-02-09

## 2023-02-09 RX ORDER — ONDANSETRON 2 MG/ML
4 INJECTION INTRAMUSCULAR; INTRAVENOUS ONCE AS NEEDED
Status: DISCONTINUED | OUTPATIENT
Start: 2023-02-09 | End: 2023-02-09 | Stop reason: HOSPADM

## 2023-02-09 RX ORDER — KETOROLAC TROMETHAMINE 30 MG/ML
INJECTION, SOLUTION INTRAMUSCULAR; INTRAVENOUS AS NEEDED
Status: DISCONTINUED | OUTPATIENT
Start: 2023-02-09 | End: 2023-02-09

## 2023-02-09 RX ORDER — OXYCODONE HYDROCHLORIDE 5 MG/1
5 TABLET ORAL EVERY 6 HOURS PRN
Qty: 12 TABLET | Refills: 0 | Status: SHIPPED | OUTPATIENT
Start: 2023-02-09 | End: 2023-02-12

## 2023-02-09 RX ORDER — DEXMEDETOMIDINE HYDROCHLORIDE 100 UG/ML
INJECTION, SOLUTION INTRAVENOUS AS NEEDED
Status: DISCONTINUED | OUTPATIENT
Start: 2023-02-09 | End: 2023-02-09

## 2023-02-09 RX ORDER — ONDANSETRON 2 MG/ML
INJECTION INTRAMUSCULAR; INTRAVENOUS AS NEEDED
Status: DISCONTINUED | OUTPATIENT
Start: 2023-02-09 | End: 2023-02-09

## 2023-02-09 RX ORDER — NEOSTIGMINE METHYLSULFATE 1 MG/ML
INJECTION INTRAVENOUS AS NEEDED
Status: DISCONTINUED | OUTPATIENT
Start: 2023-02-09 | End: 2023-02-09

## 2023-02-09 RX ORDER — PANTOPRAZOLE SODIUM 40 MG/10ML
40 INJECTION, POWDER, LYOPHILIZED, FOR SOLUTION INTRAVENOUS
Status: DISCONTINUED | OUTPATIENT
Start: 2023-02-09 | End: 2023-02-11 | Stop reason: HOSPADM

## 2023-02-09 RX ORDER — LIDOCAINE HYDROCHLORIDE 20 MG/ML
INJECTION, SOLUTION EPIDURAL; INFILTRATION; INTRACAUDAL; PERINEURAL AS NEEDED
Status: DISCONTINUED | OUTPATIENT
Start: 2023-02-09 | End: 2023-02-09

## 2023-02-09 RX ORDER — HYDROMORPHONE HCL/PF 1 MG/ML
0.5 SYRINGE (ML) INJECTION EVERY 4 HOURS PRN
Status: DISCONTINUED | OUTPATIENT
Start: 2023-02-09 | End: 2023-02-11 | Stop reason: HOSPADM

## 2023-02-09 RX ADMIN — SODIUM CHLORIDE, POTASSIUM CHLORIDE, SODIUM LACTATE AND CALCIUM CHLORIDE 125 ML/HR: 600; 310; 30; 20 INJECTION, SOLUTION INTRAVENOUS at 04:42

## 2023-02-09 RX ADMIN — SUGAMMADEX 400 MG: 100 INJECTION, SOLUTION INTRAVENOUS at 15:58

## 2023-02-09 RX ADMIN — ROCURONIUM BROMIDE 10 MG: 10 INJECTION, SOLUTION INTRAVENOUS at 15:17

## 2023-02-09 RX ADMIN — METRONIDAZOLE 500 MG: 500 INJECTION, SOLUTION INTRAVENOUS at 12:34

## 2023-02-09 RX ADMIN — DEXMEDETOMIDINE HCL 8 MCG: 100 INJECTION INTRAVENOUS at 15:00

## 2023-02-09 RX ADMIN — SODIUM CHLORIDE, POTASSIUM CHLORIDE, SODIUM LACTATE AND CALCIUM CHLORIDE 125 ML/HR: 600; 310; 30; 20 INJECTION, SOLUTION INTRAVENOUS at 18:23

## 2023-02-09 RX ADMIN — NEOSTIGMINE METHYLSULFATE 3 MG: 1 INJECTION INTRAVENOUS at 16:13

## 2023-02-09 RX ADMIN — CEFAZOLIN SODIUM 2000 MG: 2 SOLUTION INTRAVENOUS at 22:03

## 2023-02-09 RX ADMIN — METRONIDAZOLE 500 MG: 500 INJECTION, SOLUTION INTRAVENOUS at 05:06

## 2023-02-09 RX ADMIN — OXYCODONE HYDROCHLORIDE 10 MG: 10 TABLET ORAL at 22:24

## 2023-02-09 RX ADMIN — PROPOFOL 200 MG: 10 INJECTION, EMULSION INTRAVENOUS at 14:44

## 2023-02-09 RX ADMIN — KETOROLAC TROMETHAMINE 30 MG: 30 INJECTION, SOLUTION INTRAMUSCULAR at 16:02

## 2023-02-09 RX ADMIN — DEXAMETHASONE SODIUM PHOSPHATE 10 MG: 10 INJECTION INTRAMUSCULAR; INTRAVENOUS at 14:55

## 2023-02-09 RX ADMIN — DEXMEDETOMIDINE HCL 4 MCG: 100 INJECTION INTRAVENOUS at 15:04

## 2023-02-09 RX ADMIN — CEFAZOLIN SODIUM 2000 MG: 2 SOLUTION INTRAVENOUS at 11:46

## 2023-02-09 RX ADMIN — PROPOFOL 100 MG: 10 INJECTION, EMULSION INTRAVENOUS at 16:10

## 2023-02-09 RX ADMIN — OXYCODONE HYDROCHLORIDE 10 MG: 10 TABLET ORAL at 18:24

## 2023-02-09 RX ADMIN — CEFAZOLIN SODIUM 2000 MG: 2 SOLUTION INTRAVENOUS at 14:55

## 2023-02-09 RX ADMIN — SODIUM CHLORIDE, POTASSIUM CHLORIDE, SODIUM LACTATE AND CALCIUM CHLORIDE: 600; 310; 30; 20 INJECTION, SOLUTION INTRAVENOUS at 14:34

## 2023-02-09 RX ADMIN — ROCURONIUM BROMIDE 50 MG: 10 INJECTION, SOLUTION INTRAVENOUS at 14:44

## 2023-02-09 RX ADMIN — GLUCAGON HYDROCHLORIDE 1 MG: KIT at 15:35

## 2023-02-09 RX ADMIN — METRONIDAZOLE 500 MG: 500 INJECTION, SOLUTION INTRAVENOUS at 20:27

## 2023-02-09 RX ADMIN — ONDANSETRON 8 MG: 2 INJECTION INTRAMUSCULAR; INTRAVENOUS at 15:31

## 2023-02-09 RX ADMIN — OXYCODONE 5 MG: 5 TABLET ORAL at 03:04

## 2023-02-09 RX ADMIN — FENTANYL CITRATE 50 MCG: 50 INJECTION, SOLUTION INTRAMUSCULAR; INTRAVENOUS at 17:29

## 2023-02-09 RX ADMIN — FENTANYL CITRATE 100 MCG: 50 INJECTION INTRAMUSCULAR; INTRAVENOUS at 14:44

## 2023-02-09 RX ADMIN — GLYCOPYRROLATE 0.4 MG: 0.2 INJECTION INTRAMUSCULAR; INTRAVENOUS at 16:13

## 2023-02-09 RX ADMIN — MIDAZOLAM 2 MG: 1 INJECTION INTRAMUSCULAR; INTRAVENOUS at 14:32

## 2023-02-09 RX ADMIN — LIDOCAINE HYDROCHLORIDE 100 MG: 20 INJECTION, SOLUTION EPIDURAL; INFILTRATION; INTRACAUDAL; PERINEURAL at 14:44

## 2023-02-09 RX ADMIN — FENTANYL CITRATE 100 MCG: 50 INJECTION INTRAMUSCULAR; INTRAVENOUS at 15:05

## 2023-02-09 RX ADMIN — PANTOPRAZOLE SODIUM 40 MG: 40 INJECTION, POWDER, FOR SOLUTION INTRAVENOUS at 09:25

## 2023-02-09 RX ADMIN — CEFAZOLIN SODIUM 2000 MG: 2 SOLUTION INTRAVENOUS at 03:40

## 2023-02-09 NOTE — OP NOTE
OPERATIVE REPORT  PATIENT NAME: Patti Camara    :  2005  MRN: [id number]  Pt Location: [facility] OR ROOM 08    SURGERY DATE: 2023    Surgeon(s) and Role:     * Demetri Muniz MD - Primary     * Garima Alcaraz MD - Assisting    Preop Diagnosis:  Cholelithiasis [K80 20]    Post-Op Diagnosis Codes:     * Cholelithiasis [K80 20]     * Choledocholithiasis [K80 50]    Procedure(s):  CHOLECYSTECTOMY LAPAROSCOPIC WITH INTRAOPERATIVE CHOLANGIOGRAM    Specimen(s):  ID Type Source Tests Collected by Time Destination   1 : GALLBLADDER Tissue Gallbladder TISSUE EXAM Demetri Muniz MD 2023 151        Estimated Blood Loss:   Minimal    Drains:  * No LDAs found *    Anesthesia Type:   General    Operative Indications:  Cholelithiasis [K80 20]      Operative Findings:  Cholelithiasis  Intraoperative cholangiogram showed dilated common bile duct with a persistent filling defect consistent with a small stone at the sphincter  Complications:   None    Procedure and Technique:    Procedure Details   The patient was seen again in the Holding Room  The risks, benefits, complications, treatment options, and expected outcomes were discussed with the patient  The possibilities of reaction to medication, pulmonary aspiration, perforation of viscus, bleeding, recurrent infection, finding a normal gallbladder, the need for additional procedures, failure to diagnose a condition, the possible need to convert to an open procedure, and creating a complication requiring transfusion or operation were discussed with the patient  The patient and/or family concurred with the proposed plan, giving informed consent  The site of surgery properly noted/marked  The patient was taken to Operating Room, identified as Patti Camara and the procedure verified as Laparoscopic Cholecystectomy with Possible Intraoperative Cholangiogram  A Time Out was held and the above information confirmed      Prior to the induction of general anesthesia, antibiotic prophylaxis was administered  General endotracheal anesthesia was then administered and tolerated well  After the induction, the abdomen was prepped in the usual sterile fashion  The patient was positioned in the supine position  With the patient in a head-up position, an 11 mm trocar was placed in the umbilical area and three 5 mm trocars placed in the right abdomen, under direct vision  There was no evidence of intra-abdominal injury or bleeding  All skin incisions were infiltrated with a local anesthetic agent before making the incision and placing the trocars  The gallbladder was identified, the fundus grasped and retracted cephalad  Adhesions were lysed bluntly and with the electrocautery where indicated, taking care not to injure any adjacent organs or viscus  The infundibulum was grasped and retracted laterally, exposing the peritoneum overlying the triangle of Calot  The peritoneum was removed anteriorly and posteriorly to the gallbladder, with special attention to the backside of the gallbladder dissection  This allowed for freeing up the gallbladder  The critical view of the triangle Calot was carried out, dissecting out the cystic duct and cystic artery as the only two tubular structures leading to the gallbladder  Once these were clearly identified, the back wall of the gallbladder was lifted away from the cystic plate to expose the posterior aspect of this dissection, ensuring that there were no posterior structures leading into the liver  An incision was made in the cystic duct and the cholangiogram catheter introduced  The catheter was secured using an endoclip  The study showed good visualization of the distal and proximal biliary tree  However the common duct was dilated and there was a filling defect at the sphincter  A very small trickle of contrast got into the duodenum    Glucagon was administered and after an interval cholangiogram was repeated and there was still a persistent filling defect at the sphincter  The catheter was then removed  The cystic duct was then doubly ligated with surgical clips and/or Endoloop suture on the patient side and singly clipped on the gallbladder side and divided  The cystic artery was re-identified and ligated with clips and divided as well  The gallbladder was dissected from the liver bed in retrograde fashion with the electrocautery or harmonic scalpel where appropriate  The gallbladder was removed, via an Endo pouch, through the epigastric port    The liver bed was irrigated and inspected  Hemostasis was achieved  Copious irrigation was utilized and was repeatedly aspirated until clear  Pneumoperitoneum was completely reduced after viewing removal of the trocars under direct vision  The wounds were thoroughly irrigated and if needed, fascia was then closed with a figure of eight suture; the skin was then closed with 4-0 Monocryl sutures and a sterile dressing was applied  Instrument, sponge, and needle counts were correct at closure and at the conclusion of the case  The patient tolerated the procedure well  This text is generated with voice recognition software  There may be translation, syntax,  or grammatical errors  If you have any questions, please contact the dictating provider                I was present for the entire procedure    Patient Disposition:  PACU         SIGNATURE: Demetri Muniz MD  DATE: February 9, 2023  TIME: 4:03 PM

## 2023-02-09 NOTE — H&P
H&P Exam - General Surgery   Go Grande 21 y o  female MRN: [id number]  Unit/Bed#: ED-32 Encounter: [id number]    Assessment/Plan     Assessment:  20 y/o F w recurrent biliary colic now c/b persistent RUQ pain c/f early cholecystitis  Vss  Afebrile  Abdomen soft, significant tenderness RUQ  Wbc: 14  Ast/alt: 135/49  Alk phos: 172  t bili: 0 66    Plan:  Surgery admission  NPO  LR @ 125  Start ancef/ flagyl for early acute cholecystitis  Plan OR for lap cholecystectomy on     History of Present Illness   History, ROS and PFSH unobtainable from any source due to none  HPI:  Go Grande is a 21 y o  female w PMH of c section 12/10/22 who presents with recurrent biliary colic  Has been to ED multiple times w similar complaints however no s/o acute cholecystitis on imaging and was referred for outpatient follow up  However she tried bland non fat diet, but around lunch yesterday developed persistent RUQ pain associated w nausea and vomiting, worse pain than prior which did not resolve  Denied any fever or chills  No cp or sob  She is in significant discomfort and requesting cholecystectomy  Review of Systems   Constitutional: Negative for chills and fever  HENT: Negative  Eyes: Negative  Respiratory: Negative  Cardiovascular: Negative  Gastrointestinal: Positive for abdominal distention and abdominal pain  Negative for diarrhea, nausea and vomiting  Endocrine: Negative  Genitourinary: Negative  Musculoskeletal: Negative  Skin: Negative  Allergic/Immunologic: Negative  Neurological: Negative  Hematological: Negative  Psychiatric/Behavioral: Negative  Historical Information   Past Medical History:   Diagnosis Date   • Obesity (BMI 35 0-39 9 without comorbidity) 2022     Past Surgical History:   Procedure Laterality Date   • MYRINGOTOMY W/ TUBES     • [place]  DELIVERY ONLY N/A 12/10/2022    Procedure:  SECTION ();   Surgeon: Julia Acosta DO;  Location: [facility];  Service: Obstetrics     Social History   Social History     Substance and Sexual Activity   Alcohol Use Never     Social History     Substance and Sexual Activity   Drug Use Never     Social History     Tobacco Use   Smoking Status Never   Smokeless Tobacco Never     E-Cigarette/Vaping   • E-Cigarette Use Current Some Day User      E-Cigarette/Vaping Substances   • Nicotine Yes      Family History: non-contributory    Meds/Allergies   all current active meds have been reviewed  No Known Allergies    Objective   Vitals: Blood pressure 130/65, pulse 56, temperature 98 5 °F (36 9 °C), temperature source Oral, resp  rate 19, weight 96 4 kg (212 lb 8 4 oz), SpO2 98 %, not currently breastfeeding  ,Body mass index is 34 3 kg/m²  No intake or output data in the 24 hours ending 02/09/23 0254  Invasive Devices     Peripheral Intravenous Line  Duration           Peripheral IV 02/08/23 Right Wrist <1 day                Physical Exam  Vitals reviewed  HENT:      Head: Normocephalic and atraumatic  Nose: Nose normal       Mouth/Throat:      Mouth: Mucous membranes are moist    Eyes:      Pupils: Pupils are equal, round, and reactive to light  Cardiovascular:      Rate and Rhythm: Normal rate  Pulses: Normal pulses  Pulmonary:      Effort: Pulmonary effort is normal    Abdominal:      General: There is no distension  Palpations: Abdomen is soft  Tenderness: There is abdominal tenderness  There is guarding  Comments: RUq Kwon sign   Genitourinary:     Comments: deferred  Musculoskeletal:         General: Normal range of motion  Cervical back: Normal range of motion  Skin:     General: Skin is warm  Capillary Refill: Capillary refill takes 2 to 3 seconds  Neurological:      General: No focal deficit present  Mental Status: She is alert and oriented to person, place, and time     Psychiatric:         Mood and Affect: Mood normal          Lab Results: I have personally reviewed pertinent reports  Imaging: I have personally reviewed pertinent reports  EKG, Pathology, and Other Studies: I have personally reviewed pertinent reports  Code Status: Level 1 - Full Code  Advance Directive and Living Will:      Power of :    POLST:      Counseling / Coordination of Care  Counseling/Coordination of Care: Total floor / unit time spent today 30 minutes  Greater than 50% of total time was spent with the patient and / or family counseling and / or coordination of care   A description of the counseling / coordination of care: 30

## 2023-02-09 NOTE — PROGRESS NOTES
Progress Note - Iman Chin 21 y o  female MRN: [id number]    Unit/Bed#: [address] 210-02 Encounter: [id number]      Assessment:  22 y/o F w recurrent biliary colic c/f early cholecystitis  Vss  Afebrile  Abdomen is soft, moderate RUQ tenderness, non distended  RUQ US on 2/9 showing increase in biliary duct dilation  Bilirubin 0 6-> 0 6    Plan:  NPO  Continue iv abx, ancef/ flagyl  LR @ 125  dvt ppx  Lap mandi today, possible IOC    Subjective:   Still complaints of RUQ pain  Intermittent chills overnight  No cp or sob  Objective:     Vitals: Blood pressure 125/67, pulse 55, temperature 98 2 °F (36 8 °C), resp  rate 18, height 5' 6" (1 676 m), weight 96 4 kg (212 lb 8 4 oz), SpO2 95 %, not currently breastfeeding  ,Body mass index is 34 3 kg/m²  Intake/Output Summary (Last 24 hours) at 2/9/2023 0727  Last data filed at 2/9/2023 0410  Gross per 24 hour   Intake 50 ml   Output --   Net 50 ml     Physical Exam  General: NAD  HEENT: NC/AT  MMM  Cv: RRR  Lungs: normal effort  Ab: Soft, moderate RUQ tender  Non distended  Ex: no CCE  Neuro: AAOx3        Invasive Devices     Peripheral Intravenous Line  Duration           Peripheral IV 02/08/23 Right Wrist <1 day                Lab, Imaging and other studies: I have personally reviewed pertinent reports      VTE Pharmacologic Prophylaxis: Sequential compression device (Venodyne)   VTE Mechanical Prophylaxis: sequential compression device

## 2023-02-09 NOTE — DISCHARGE INSTR - AVS FIRST PAGE
Post-Operative Care Instructions  Dr Aaron Awad MD, [facility]  [phone]    1  General: You will feel pulling sensations around the wound or funny aches and pains around the incisions  This is normal  Even minor surgery is a change in your body and this is your body’s way of reaction to it  If you have had abdominal surgery, it may help to support the incision with a small pillow or blanket for comfort when moving or coughing  2  Wound care:  Okay to shower  The glue will fall off over the next week or 2     3  Water: You may shower over the wound, unless there are drain tubes left in place  Do not bathe or use a pool or hot tub until cleared by the physician  4  Activity: You may go up and down stairs, walk as much as you are comfortable, but walk at least 3 times each day  If you have had abdominal surgery, do not lift anything heavier than 15 pounds for at least 2-4 weeks, unless cleared by the doctor  5  Diet: You may resume a regular diet  If you had a same-day surgery or overnight stay surgery, he may wish to eat lightly for a few days: soups, crackers, and sandwiches  You may resume a regular diet when ready  6  Medications: Resume all of your previous medications, unless told otherwise by the doctor  Avoid aspirin or ibuprofen (Advil, Motrin, etc ) products for 2-3 days after the date of surgery  You may, at that time, began to take them again  Tylenol is always fine, unless you are taking any narcotic pain medication containing Tylenol (such as Percocet, Darvocet, Vicodin, or anything containing acetaminophen)  Do not take Tylenol if you're taking these medications  You do not need to take the narcotic pain medications unless you are having significant pain and discomfort  7  Driving: You will need someone to drive you home on the day of surgery  Do not drive or make any important decisions while on narcotic pain medication or 24 hours and after anesthesia or sedation for surgery  Generally, you may drive when your off all narcotic pain medications  8  Upset Stomach: You may take Maalox, Tums, or similar items for an upset stomach  If your narcotic pain medication causes an upset stomach, do not take it on an empty stomach  Try taking it with at least some crackers or toast      9  Constipation: Patients often experienced constipation after surgery  You may take over-the-counter medication for this, such as Metamucil, Senokot, Dulcolax, milk of magnesia, etc  You may take a suppository unless you have had anorectal surgery such as a procedure on your hemorrhoids  If you experience significant nausea or vomiting after abdominal surgery, call the office before trying any of these medications  10  Call the office: If you are experiencing any of the following, fevers above 101 5°, significant nausea or vomiting, if the wound develops drainage and/or is excessive redness around the wound, or if you have significant diarrhea or other worsening symptoms  11  Pain: You may be given a prescription for pain  This will be given to the hospital, the day of surgery  12  Sexual Activity: You may resume sexual activity when you feel ready and comfortable and your incision is sealed and healed without apparent infection risk  13  Urination: If you haven't urinated in 6 hours, go directly to the ER for evaluation for urinary retention  14  Follow-up in 2 weeks

## 2023-02-09 NOTE — PLAN OF CARE
Problem: PAIN - ADULT  Goal: Verbalizes/displays adequate comfort level or baseline comfort level  Description: Interventions:  - Encourage patient to monitor pain and request assistance  - Assess pain using appropriate pain scale  - Administer analgesics based on type and severity of pain and evaluate response  - Implement non-pharmacological measures as appropriate and evaluate response  - Consider cultural and social influences on pain and pain management  - Notify physician/advanced practitioner if interventions unsuccessful or patient reports new pain  Outcome: Progressing     Problem: INFECTION - ADULT  Goal: Absence or prevention of progression during hospitalization  Description: INTERVENTIONS:  - Assess and monitor for signs and symptoms of infection  - Monitor lab/diagnostic results  - Monitor all insertion sites, i e  indwelling lines, tubes, and drains  - Monitor endotracheal if appropriate and nasal secretions for changes in amount and color  - [place]xton appropriate cooling/warming therapies per order  - Administer medications as ordered  - Instruct and encourage patient and family to use good hand hygiene technique  - Identify and instruct in appropriate isolation precautions for identified infection/condition  Outcome: Progressing  Goal: Absence of fever/infection during neutropenic period  Description: INTERVENTIONS:  - Monitor WBC    Outcome: Progressing     Problem: SAFETY ADULT  Goal: Patient will remain free of falls  Description: INTERVENTIONS:  - Educate patient/family on patient safety including physical limitations  - Instruct patient to call for assistance with activity   - Consult OT/PT to assist with strengthening/mobility   - Keep Call bell within reach  - Keep bed low and locked with side rails adjusted as appropriate  - Keep care items and personal belongings within reach  - Initiate and maintain comfort rounds  - Make Fall Risk Sign visible to staff  - Offer Toileting every 2 Hours, in advance of need  - Initiate/Maintain bed alarm  - Obtain necessary fall risk management equipment  - Apply yellow socks and bracelet for high fall risk patients  - Consider moving patient to room near nurses station  Outcome: Progressing  Goal: Maintain or return to baseline ADL function  Description: INTERVENTIONS:  -  Assess patient's ability to carry out ADLs; assess patient's baseline for ADL function and identify physical deficits which impact ability to perform ADLs (bathing, care of mouth/teeth, toileting, grooming, dressing, etc )  - Assess/evaluate cause of self-care deficits   - Assess range of motion  - Assess patient's mobility; develop plan if impaired  - Assess patient's need for assistive devices and provide as appropriate  - Encourage maximum independence but intervene and supervise when necessary  - Involve family in performance of ADLs  - Assess for home care needs following discharge   - Consider OT consult to assist with ADL evaluation and planning for discharge  - Provide patient education as appropriate  Outcome: Progressing  Goal: Maintains/Returns to pre admission functional level  Description: INTERVENTIONS:  - Perform BMAT or MOVE assessment daily    - Set and communicate daily mobility goal to care team and patient/family/caregiver  - Collaborate with rehabilitation services on mobility goals if consulted  - Perform Range of Motion 2 times a day  - Reposition patient every 2 hours    - Dangle patient 2 times a day  - Stand patient 2 times a day  - Ambulate patient 2 times a day  - Out of bed to chair 2 times a day   - Out of bed for meals 2 times a day  - Out of bed for toileting  - Record patient progress and toleration of activity level   Outcome: Progressing     Problem: DISCHARGE PLANNING  Goal: Discharge to home or other facility with appropriate resources  Description: INTERVENTIONS:  - Identify barriers to discharge w/patient and caregiver  - Arrange for needed discharge resources and transportation as appropriate  - Identify discharge learning needs (meds, wound care, etc )  - Arrange for interpretive services to assist at discharge as needed  - Refer to Case Management Department for coordinating discharge planning if the patient needs post-hospital services based on physician/advanced practitioner order or complex needs related to functional status, cognitive ability, or social support system  Outcome: Progressing     Problem: Knowledge Deficit  Goal: Patient/family/caregiver demonstrates understanding of disease process, treatment plan, medications, and discharge instructions  Description: Complete learning assessment and assess knowledge base    Interventions:  - Provide teaching at level of understanding  - Provide teaching via preferred learning methods  Outcome: Progressing

## 2023-02-09 NOTE — ED PROVIDER NOTES
History  Chief Complaint   Patient presents with   • Abdominal Pain     Patient states having a gall bladder attack  States seen last week and wanted to remove, but patient declined  Patient is a 15-year-old female otherwise healthy coming in today for which she states is a gallbladder attack  States that she has been here several times and is having abdominal pain  Reports that she was scared to have surgery she decided to go home  She reports that she has been trying to watch what she eats and stay away from fried foods and greasy foods  She ate a sandwich around 1230 but the pain is still there  She points to the epigastric and right upper quadrant rating around to her right back  He has no fevers or chills  He has not taken anything for this  She states that she has a consult with the surgeon on   He has no chest pain, palpitations, shortness of breath or vomiting  In chart review patient was seen 4 times in the past 2 months for symptoms  On  patient was seen by general surgery resident  Her son at the time showed right upper quadrant gallstones with out Peris cholecystic fluid they recommended low-fat diet and Augmentin  Follow-up LFTs given mild elevation  Have recent surgery for  in 2022  Recently patient was seen on February 3    During that time it is documented the patient was seen by surgery and offered cholecystectomy however declined      History provided by:  Patient  Abdominal Pain  Pain location:  Epigastric and RUQ  Pain quality: aching, cramping, fullness and pressure    Pain radiates to:  R flank  Pain severity:  Moderate  Onset quality:  Gradual  Timing:  Constant  Progression:  Waxing and waning  Chronicity:  Recurrent  Context: eating    Context: not alcohol use, not awakening from sleep, not diet changes, not laxative use, not medication withdrawal, not previous surgeries, not recent illness, not recent sexual activity, not recent travel, not retching, not sick contacts, not suspicious food intake and not trauma    Relieved by:  None tried  Worsened by:  Nothing  Ineffective treatments:  None tried  Associated symptoms: nausea    Associated symptoms: no anorexia, no belching, no chest pain, no chills, no constipation, no cough, no diarrhea, no dysuria, no fatigue, no fever, no flatus, no hematemesis, no hematochezia, no hematuria, no melena, no shortness of breath, no sore throat, no vaginal bleeding, no vaginal discharge and no vomiting    Risk factors: obesity    Risk factors: no alcohol abuse, no aspirin use, not elderly, has not had multiple surgeries, no NSAID use, not pregnant and no recent hospitalization        Prior to Admission Medications   Prescriptions Last Dose Informant Patient Reported? Taking?   docusate sodium (COLACE) 50 mg capsule   No No   Sig: Take 1 capsule (50 mg total) by mouth 2 (two) times a day   naproxen (Naprosyn) 500 mg tablet   No No   Sig: Take 1 tablet (500 mg total) by mouth 2 (two) times a day with meals   ondansetron (ZOFRAN-ODT) 4 mg disintegrating tablet   No No   Sig: Take 1 tablet (4 mg total) by mouth every 6 (six) hours as needed for nausea or vomiting   oxyCODONE-acetaminophen (PERCOCET) 5-325 mg per tablet   No No   Sig: Take 1 tablet by mouth every 8 (eight) hours as needed for severe pain for up to 5 doses Max Daily Amount: 3 tablets   pantoprazole (PROTONIX) 40 mg tablet   No No   Sig: Take 1 tablet (40 mg total) by mouth daily      Facility-Administered Medications: None       Past Medical History:   Diagnosis Date   • Obesity (BMI 35 0-39 9 without comorbidity) 2022       Past Surgical History:   Procedure Laterality Date   • MYRINGOTOMY W/ TUBES     • GA  DELIVERY ONLY N/A 12/10/2022    Procedure:  SECTION ();   Surgeon: Reshma Mcqueen DO;  Location: [facility];  Service: Obstetrics       Family History   Problem Relation Age of Onset   • Hyperlipidemia Mother    • No Known Problems Father      I have reviewed and agree with the history as documented  E-Cigarette/Vaping   • E-Cigarette Use Current Some Day User      E-Cigarette/Vaping Substances   • Nicotine Yes      Social History     Tobacco Use   • Smoking status: Never   • Smokeless tobacco: Never   Vaping Use   • Vaping Use: Some days   • Substances: Nicotine   Substance Use Topics   • Alcohol use: Never   • Drug use: Never       Review of Systems   Constitutional: Negative  Negative for chills, fatigue and fever  HENT: Negative  Negative for ear pain and sore throat  Eyes: Negative for pain and visual disturbance  Respiratory: Negative  Negative for cough and shortness of breath  Cardiovascular: Negative  Negative for chest pain and palpitations  Gastrointestinal: Positive for abdominal pain and nausea  Negative for anorexia, constipation, diarrhea, flatus, hematemesis, hematochezia, melena and vomiting  Genitourinary: Negative  Negative for dysuria, hematuria, vaginal bleeding and vaginal discharge  Musculoskeletal: Negative  Negative for arthralgias and back pain  Skin: Negative  Negative for color change and rash  Neurological: Negative for seizures and syncope  Hematological: Negative  Psychiatric/Behavioral: Negative  All other systems reviewed and are negative  Physical Exam  Physical Exam  Vitals and nursing note reviewed  Constitutional:       General: She is not in acute distress  Appearance: She is well-developed  HENT:      Head: Normocephalic and atraumatic  Comments: Patient maintaining airway and secretions  No stridor   No brawniness under tongue  Mouth/Throat:      Mouth: Mucous membranes are moist    Eyes:      Extraocular Movements: Extraocular movements intact  Conjunctiva/sclera: Conjunctivae normal    Cardiovascular:      Rate and Rhythm: Normal rate and regular rhythm        Pulses:           Radial pulses are 2+ on the right side and 2+ on the left side  Dorsalis pedis pulses are 2+ on the right side and 2+ on the left side  Heart sounds: Normal heart sounds, S1 normal and S2 normal  No murmur heard  Pulmonary:      Effort: Pulmonary effort is normal  No respiratory distress  Breath sounds: Normal breath sounds  Abdominal:      General: Abdomen is protuberant  Bowel sounds are normal       Palpations: Abdomen is soft  Tenderness: There is abdominal tenderness in the right upper quadrant and epigastric area  There is no right CVA tenderness, left CVA tenderness, guarding or rebound  Positive signs include Kwon's sign  Negative signs include Rovsing's sign and McBurney's sign  Musculoskeletal:         General: No swelling  Cervical back: Neck supple  Right lower leg: No edema  Left lower leg: No edema  Skin:     General: Skin is warm and dry  Capillary Refill: Capillary refill takes less than 2 seconds  Neurological:      General: No focal deficit present  Mental Status: She is alert and oriented to person, place, and time  GCS: GCS eye subscore is 4  GCS verbal subscore is 5  GCS motor subscore is 6  Cranial Nerves: Cranial nerves 2-12 are intact  Sensory: Sensation is intact  Motor: Motor function is intact  Coordination: Coordination is intact  Gait: Gait is intact  Comments: No slurred speech  No facial asymmetry  No ataxia patient ambulated throughout the ER with a nonantalgic gait    She moves bilateral for extremities and lower extremities spontaneously and pain-free   Psychiatric:         Mood and Affect: Mood normal          Vital Signs  ED Triage Vitals   Temperature Pulse Respirations Blood Pressure SpO2   02/08/23 1919 02/08/23 1919 02/08/23 1919 02/08/23 1919 02/08/23 1919   98 5 °F (36 9 °C) 56 19 130/65 98 %      Temp Source Heart Rate Source Patient Position - Orthostatic VS BP Location FiO2 (%)   02/08/23 1919 02/08/23 1919 02/08/23 1919 02/08/23 1919 --   Oral Monitor Sitting Right arm       Pain Score       02/08/23 2144       6           Vitals:    02/08/23 1919   BP: 130/65   Pulse: 56   Patient Position - Orthostatic VS: Sitting         Visual Acuity      ED Medications  Medications   fentanyl citrate (PF) 100 MCG/2ML 50 mcg (50 mcg Intravenous Given 2/8/23 2144)   ondansetron (ZOFRAN) injection 4 mg (4 mg Intravenous Given 2/8/23 2142)       Diagnostic Studies  Results Reviewed     Procedure Component Value Units Date/Time    POCT pregnancy, urine [[id number]]  (Normal) Resulted: 02/08/23 2348    Lab Status: Final result Updated: 02/08/23 2348     EXT Preg Test, Ur Negative     Control Valid    Urine Macroscopic, POC [[id number]] Collected: 02/08/23 2345    Lab Status: Final result Specimen: Urine Updated: 02/08/23 2346     Color, UA Yellow     Clarity, UA Clear     pH, UA 6 5     Leukocytes, UA Negative     Nitrite, UA Negative     Protein, UA Negative mg/dl      Glucose, UA Negative mg/dl      Ketones, UA Negative mg/dl      Urobilinogen, UA 0 2 E U /dl      Bilirubin, UA Negative     Occult Blood, UA Negative     Specific Gravity, UA 1 025    Narrative:      CLINITEK RESULT    Comprehensive metabolic panel [[id number]]  (Abnormal) Collected: 02/08/23 2142    Lab Status: Final result Specimen: Blood from Hand, Right Updated: 02/08/23 2215     Sodium 136 mmol/L      Potassium 4 1 mmol/L      Chloride 104 mmol/L      CO2 22 mmol/L      ANION GAP 10 mmol/L      BUN 8 mg/dL      Creatinine 0 69 mg/dL      Glucose 108 mg/dL      Calcium 9 8 mg/dL       U/L      ALT 49 U/L      Alkaline Phosphatase 173 U/L      Total Protein 8 0 g/dL      Albumin 4 4 g/dL      Total Bilirubin 0 66 mg/dL      eGFR 126 ml/min/1 73sq m     Narrative:      [place] guidelines for Chronic Kidney Disease (CKD):   •  Stage 1 with normal or high GFR (GFR > 90 mL/min/1 73 square meters)  •  Stage 2 Mild CKD (GFR = 60-89 mL/min/1 73 square meters)  •  Stage 3A Moderate CKD (GFR = 45-59 mL/min/1 73 square meters)  •  Stage 3B Moderate CKD (GFR = 30-44 mL/min/1 73 square meters)  •  Stage 4 Severe CKD (GFR = 15-29 mL/min/1 73 square meters)  •  Stage 5 End Stage CKD (GFR <15 mL/min/1 73 square meters)  Note: GFR calculation is accurate only with a steady state creatinine    Magnesium [[id number]]  (Normal) Collected: 02/08/23 2142    Lab Status: Final result Specimen: Blood from Hand, Right Updated: 02/08/23 2215     Magnesium 2 0 mg/dL     Lipase [[id number]]  (Normal) Collected: 02/08/23 2142    Lab Status: Final result Specimen: Blood from Hand, Right Updated: 02/08/23 2215     Lipase 20 u/L     Lactic acid [[id number]]  (Normal) Collected: 02/08/23 2142    Lab Status: Final result Specimen: Blood from Arm, Right Updated: 02/08/23 2215     LACTIC ACID 1 2 mmol/L     Narrative:      Result may be elevated if tourniquet was used during collection      Protime-INR [[id number]]  (Normal) Collected: 02/08/23 2142    Lab Status: Final result Specimen: Blood from Arm, Right Updated: 02/08/23 2212     Protime 13 5 seconds      INR 1 03    APTT [[id number]]  (Normal) Collected: 02/08/23 2142    Lab Status: Final result Specimen: Blood from Arm, Right Updated: 02/08/23 2212     PTT 31 seconds     CBC and differential [[id number]]  (Abnormal) Collected: 02/08/23 2142    Lab Status: Final result Specimen: Blood from Arm, Right Updated: 02/08/23 2153     WBC 14 81 Thousand/uL      RBC 4 90 Million/uL      Hemoglobin 12 1 g/dL      Hematocrit 39 6 %      MCV 81 fL      MCH 24 7 pg      MCHC 30 6 g/dL      RDW 14 1 %      MPV 10 7 fL      Platelets 459 Thousands/uL      nRBC 0 /100 WBCs      Neutrophils Relative 87 %      Immat GRANS % 0 %      Lymphocytes Relative 10 %      Monocytes Relative 3 %      Eosinophils Relative 0 %      Basophils Relative 0 %      Neutrophils Absolute 12 80 Thousands/µL      Immature Grans Absolute 0 05 Thousand/uL      Lymphocytes Absolute 1 46 Thousands/µL      Monocytes Absolute 0 45 Thousand/µL      Eosinophils Absolute 0 00 Thousand/µL      Basophils Absolute 0 05 Thousands/µL                  US right upper quadrant   Final Result by Negrita Turner MD (02/08 2353)      1  Cholelithiasis without evidence of cholecystitis  2   Interval increase in biliary duct dilatation  Although no proximal choledocholithiasis is seen, further evaluation with MRI with MRCP may be obtained which is more sensitive for distal stones if clinically warranted  Workstation performed: [id number]                    Procedures  Procedures         ED Course  ED Course as of 02/09/23 0016   Wed Feb 08, 2023 2059 Patient is a 68-year-old male returns with abdominal pain consistent with her gallbladder issues  On exam she is tender to right upper quadrant  Nonperitoneal   Will review chart, repeat labs ultrasound and give antiemetics as well as pain control    Disclosure: Voice to text software was used in the preparation of this document and could have resulted in translational errors       Occasional wrong word or "sound a like" substitutions may have occurred due to the inherent limitations of voice recognition software   Read the chart carefully and recognize, using context, where substitutions have occurred         2229 Patient with leukocytosis no bands  Alk phos mildly elevated as well as ALT  Pending ultrasound   2358 USound results with cholelithiasis and noted intervals of increase in biliary dilatation  We will reach out to general surgeon   u Feb 09, 2023   Beau Galeazzi was down for short time  General surgery was by to evaluate patient and will admit for observation for cholecystectomy    Discussed with gen Anuel surgery  We had a detailed discussion of the patient's condition and case,  including need for admission  Accepts to his/her service  Bed request/bridging orders placed                                                   Medical Decision Making      Differential diagnosis includes but not limited to:  Appendicitis, viral syndrome, constipation, AMI, NSTEMI, pneumonia, pneuothorax, gerd, gastritis,  mesenteric ischemia, mesenteric adenitis, pancreatitis, cholecystitis, choledocholithiasis, hepatitis, bowel obstruction, ileus, gastroenteritis, colitis, malignancy, AAA, perforation, toxicologic poisoning, renal infarct, acute kidney injury, splenic infarct, splenic injury, nephrolithiasis, UTI, muscular strain, intra-abdominal hematoma, hernia, ovarian cyst, ovarian torsion, ectopic pregnancy, rectal prolapse, pain no rectal fistula, a no rectal fissures, hemorrhoids, perirectal abscess, threatened , PID, abruption, molar pregnancy, dislodged IUD, fibroid uterus, salpingitis, tubo-ovarian abscess, dysmenorrhea, cervicitis,      Amount and/or Complexity of Data Reviewed  Labs: ordered  Decision-making details documented in ED Course  Radiology: ordered  Decision-making details documented in ED Course  Risk  Prescription drug management  Disposition  Final diagnoses:   Cholelithiasis   Abdominal pain   Biliary colic     Time reflects when diagnosis was documented in both MDM as applicable and the Disposition within this note     Time User Action Codes Description Comment    2023 12:16 AM Felisa Spann Add [K80 20] Cholelithiasis     2023 12:16 AM Sindy Pinto L Add [R10 9] Abdominal pain     2023 12:16 AM [place] Spann Add [N29 90] Biliary colic       ED Disposition     ED Disposition   Admit    Condition   Stable    Date/Time   Thu 2023 12:15 AM    Comment   Case was discussed with Dr Pete Bhakta and the patient's admission status was agreed to be Admission Status: observation status to the service of Dr Emily Simon   Follow-up Information    None         Patient's Medications   Discharge Prescriptions    No medications on file       No discharge procedures on file      PDMP Review     None ED Provider  Electronically Signed by           Mulugeta Valentine DO  02/09/23 7367

## 2023-02-09 NOTE — ANESTHESIA PREPROCEDURE EVALUATION
Procedure:  CHOLECYSTECTOMY LAPAROSCOPIC (Abdomen)    Relevant Problems   NEURO/PSYCH   (+) Recurrent biliary colic      Other   (+) Obesity (BMI 35 0-39 9 without comorbidity)   (+) S/P primary low transverse         Physical Exam    Airway    Mallampati score: II  TM Distance: >3 FB  Neck ROM: full     Dental   upper dentures and lower dentures,     Cardiovascular  Rhythm: regular, Rate: normal, Cardiovascular exam normal    Pulmonary  Pulmonary exam normal Breath sounds clear to auscultation,     Other Findings        Anesthesia Plan  ASA Score- 2     Anesthesia Type- general with ASA Monitors  Additional Monitors:   Airway Plan: ETT  Comment: Pt has a nose ring that does not come out          Plan Factors-    Chart reviewed  Existing labs reviewed  Patient summary reviewed  Induction- intravenous  Postoperative Plan- Plan for postoperative opioid use  Informed Consent- Anesthetic plan and risks discussed with patient

## 2023-02-10 ENCOUNTER — ANESTHESIA EVENT (OUTPATIENT)
Dept: PERIOP | Facility: HOSPITAL | Age: 18
End: 2023-02-10

## 2023-02-10 ENCOUNTER — ANESTHESIA (OUTPATIENT)
Dept: PERIOP | Facility: HOSPITAL | Age: 18
End: 2023-02-10

## 2023-02-10 ENCOUNTER — APPOINTMENT (OUTPATIENT)
Dept: RADIOLOGY | Facility: HOSPITAL | Age: 18
End: 2023-02-10

## 2023-02-10 ENCOUNTER — APPOINTMENT (OUTPATIENT)
Dept: PERIOP | Facility: HOSPITAL | Age: 18
End: 2023-02-10

## 2023-02-10 LAB
ALBUMIN SERPL BCP-MCNC: 3.5 G/DL (ref 3.5–5)
ALP SERPL-CCNC: 157 U/L (ref 34–104)
ALT SERPL W P-5'-P-CCNC: 90 U/L (ref 7–52)
ANION GAP SERPL CALCULATED.3IONS-SCNC: 7 MMOL/L (ref 4–13)
AST SERPL W P-5'-P-CCNC: 192 U/L (ref 13–39)
BASOPHILS # BLD AUTO: 0.02 THOUSANDS/ÂΜL (ref 0–0.1)
BASOPHILS NFR BLD AUTO: 0 % (ref 0–1)
BILIRUB SERPL-MCNC: 0.46 MG/DL (ref 0.2–1)
BUN SERPL-MCNC: 6 MG/DL (ref 5–25)
CALCIUM SERPL-MCNC: 8.9 MG/DL (ref 8.4–10.2)
CHLORIDE SERPL-SCNC: 104 MMOL/L (ref 96–108)
CO2 SERPL-SCNC: 26 MMOL/L (ref 21–32)
CREAT SERPL-MCNC: 0.73 MG/DL (ref 0.6–1.3)
EOSINOPHIL # BLD AUTO: 0 THOUSAND/ÂΜL (ref 0–0.61)
EOSINOPHIL NFR BLD AUTO: 0 % (ref 0–6)
ERYTHROCYTE [DISTWIDTH] IN BLOOD BY AUTOMATED COUNT: 14 % (ref 11.6–15.1)
GFR SERPL CREATININE-BSD FRML MDRD: 119 ML/MIN/1.73SQ M
GLUCOSE P FAST SERPL-MCNC: 90 MG/DL (ref 65–99)
GLUCOSE SERPL-MCNC: 90 MG/DL (ref 65–140)
HCT VFR BLD AUTO: 32.7 % (ref 34.8–46.1)
HGB BLD-MCNC: 9.9 G/DL (ref 11.5–15.4)
IMM GRANULOCYTES # BLD AUTO: 0.06 THOUSAND/UL (ref 0–0.2)
IMM GRANULOCYTES NFR BLD AUTO: 1 % (ref 0–2)
LYMPHOCYTES # BLD AUTO: 1.51 THOUSANDS/ÂΜL (ref 0.6–4.47)
LYMPHOCYTES NFR BLD AUTO: 12 % (ref 14–44)
MCH RBC QN AUTO: 24.2 PG (ref 26.8–34.3)
MCHC RBC AUTO-ENTMCNC: 30.3 G/DL (ref 31.4–37.4)
MCV RBC AUTO: 80 FL (ref 82–98)
MONOCYTES # BLD AUTO: 0.72 THOUSAND/ÂΜL (ref 0.17–1.22)
MONOCYTES NFR BLD AUTO: 6 % (ref 4–12)
NEUTROPHILS # BLD AUTO: 9.97 THOUSANDS/ÂΜL (ref 1.85–7.62)
NEUTS SEG NFR BLD AUTO: 81 % (ref 43–75)
NRBC BLD AUTO-RTO: 0 /100 WBCS
PLATELET # BLD AUTO: 269 THOUSANDS/UL (ref 149–390)
PMV BLD AUTO: 11.3 FL (ref 8.9–12.7)
POTASSIUM SERPL-SCNC: 3.9 MMOL/L (ref 3.5–5.3)
PROT SERPL-MCNC: 6.3 G/DL (ref 6.4–8.4)
RBC # BLD AUTO: 4.09 MILLION/UL (ref 3.81–5.12)
SODIUM SERPL-SCNC: 137 MMOL/L (ref 135–147)
WBC # BLD AUTO: 12.28 THOUSAND/UL (ref 4.31–10.16)

## 2023-02-10 RX ORDER — SODIUM CHLORIDE 9 MG/ML
INJECTION, SOLUTION INTRAVENOUS CONTINUOUS PRN
Status: DISCONTINUED | OUTPATIENT
Start: 2023-02-10 | End: 2023-02-10

## 2023-02-10 RX ORDER — GLYCOPYRROLATE 0.2 MG/ML
INJECTION INTRAMUSCULAR; INTRAVENOUS AS NEEDED
Status: DISCONTINUED | OUTPATIENT
Start: 2023-02-10 | End: 2023-02-10

## 2023-02-10 RX ORDER — FENTANYL CITRATE 50 UG/ML
INJECTION, SOLUTION INTRAMUSCULAR; INTRAVENOUS AS NEEDED
Status: DISCONTINUED | OUTPATIENT
Start: 2023-02-10 | End: 2023-02-10

## 2023-02-10 RX ORDER — NEOSTIGMINE METHYLSULFATE 1 MG/ML
INJECTION INTRAVENOUS AS NEEDED
Status: DISCONTINUED | OUTPATIENT
Start: 2023-02-10 | End: 2023-02-10

## 2023-02-10 RX ORDER — ONDANSETRON 2 MG/ML
4 INJECTION INTRAMUSCULAR; INTRAVENOUS ONCE AS NEEDED
Status: CANCELLED | OUTPATIENT
Start: 2023-02-10

## 2023-02-10 RX ORDER — PROPOFOL 10 MG/ML
INJECTION, EMULSION INTRAVENOUS AS NEEDED
Status: DISCONTINUED | OUTPATIENT
Start: 2023-02-10 | End: 2023-02-10

## 2023-02-10 RX ORDER — ONDANSETRON 2 MG/ML
INJECTION INTRAMUSCULAR; INTRAVENOUS AS NEEDED
Status: DISCONTINUED | OUTPATIENT
Start: 2023-02-10 | End: 2023-02-10

## 2023-02-10 RX ORDER — ROCURONIUM BROMIDE 10 MG/ML
INJECTION, SOLUTION INTRAVENOUS AS NEEDED
Status: DISCONTINUED | OUTPATIENT
Start: 2023-02-10 | End: 2023-02-10

## 2023-02-10 RX ORDER — LIDOCAINE HYDROCHLORIDE 20 MG/ML
INJECTION, SOLUTION EPIDURAL; INFILTRATION; INTRACAUDAL; PERINEURAL AS NEEDED
Status: DISCONTINUED | OUTPATIENT
Start: 2023-02-10 | End: 2023-02-10

## 2023-02-10 RX ORDER — MIDAZOLAM HYDROCHLORIDE 2 MG/2ML
INJECTION, SOLUTION INTRAMUSCULAR; INTRAVENOUS AS NEEDED
Status: DISCONTINUED | OUTPATIENT
Start: 2023-02-10 | End: 2023-02-10

## 2023-02-10 RX ORDER — SODIUM CHLORIDE 9 MG/ML
125 INJECTION, SOLUTION INTRAVENOUS CONTINUOUS
Status: CANCELLED | OUTPATIENT
Start: 2023-02-10

## 2023-02-10 RX ADMIN — ENOXAPARIN SODIUM 40 MG: 40 INJECTION SUBCUTANEOUS at 19:15

## 2023-02-10 RX ADMIN — PANTOPRAZOLE SODIUM 40 MG: 40 INJECTION, POWDER, FOR SOLUTION INTRAVENOUS at 08:54

## 2023-02-10 RX ADMIN — MIDAZOLAM 2 MG: 1 INJECTION INTRAMUSCULAR; INTRAVENOUS at 17:14

## 2023-02-10 RX ADMIN — ROCURONIUM BROMIDE 30 MG: 10 INJECTION, SOLUTION INTRAVENOUS at 17:22

## 2023-02-10 RX ADMIN — NEOSTIGMINE METHYLSULFATE 3.5 MG: 1 INJECTION INTRAVENOUS at 18:06

## 2023-02-10 RX ADMIN — SODIUM CHLORIDE: 0.9 INJECTION, SOLUTION INTRAVENOUS at 17:10

## 2023-02-10 RX ADMIN — OXYCODONE HYDROCHLORIDE 10 MG: 10 TABLET ORAL at 03:05

## 2023-02-10 RX ADMIN — SODIUM CHLORIDE, POTASSIUM CHLORIDE, SODIUM LACTATE AND CALCIUM CHLORIDE 125 ML/HR: 600; 310; 30; 20 INJECTION, SOLUTION INTRAVENOUS at 03:06

## 2023-02-10 RX ADMIN — ONDANSETRON 4 MG: 2 INJECTION INTRAMUSCULAR; INTRAVENOUS at 18:06

## 2023-02-10 RX ADMIN — PROPOFOL 200 MG: 10 INJECTION, EMULSION INTRAVENOUS at 17:22

## 2023-02-10 RX ADMIN — OXYCODONE 5 MG: 5 TABLET ORAL at 08:54

## 2023-02-10 RX ADMIN — GLYCOPYRROLATE 0.6 MG: 0.2 INJECTION INTRAMUSCULAR; INTRAVENOUS at 18:06

## 2023-02-10 RX ADMIN — LIDOCAINE HYDROCHLORIDE 100 MG: 20 INJECTION, SOLUTION EPIDURAL; INFILTRATION; INTRACAUDAL; PERINEURAL at 17:22

## 2023-02-10 RX ADMIN — METRONIDAZOLE 500 MG: 500 INJECTION, SOLUTION INTRAVENOUS at 04:28

## 2023-02-10 RX ADMIN — IOHEXOL 13 ML: 300 INJECTION, SOLUTION INTRAVENOUS at 18:05

## 2023-02-10 RX ADMIN — SODIUM CHLORIDE, POTASSIUM CHLORIDE, SODIUM LACTATE AND CALCIUM CHLORIDE 125 ML/HR: 600; 310; 30; 20 INJECTION, SOLUTION INTRAVENOUS at 22:07

## 2023-02-10 RX ADMIN — OXYCODONE 5 MG: 5 TABLET ORAL at 22:10

## 2023-02-10 RX ADMIN — FENTANYL CITRATE 100 MCG: 50 INJECTION INTRAMUSCULAR; INTRAVENOUS at 17:22

## 2023-02-10 NOTE — CONSULTS
Consultation - [facility]  July Merdiazewski 21 y o  female MRN: [id number]  Unit/Bed#: [address] [place] 87 210-02 Encounter: [id number]              Inpatient consult to gastroenterology     Performed by  Corbin Villa DO     Authorized by Lupe Nguyen MD              Reason for Consult / Principal Problem:     Choledocholithiasis      ASSESSMENT AND PLAN:      33-year-old female with past medical history of obesity who is admitted for early cholecystitis  She is postop day 1 from cholecystectomy  GI was consulted as intraoperative cholangiogram was positive for filling defect in the CBD  1  Choledocholithiasis  2  Cholecystitis  3  Elevated LFTs  Intra-Op cholangiogram showed filling defect in the CBD  Patient's liver enzymes also increasing, however with normal bilirubin  No signs of cholangitis currently  · Plan for ERCP today  · Continue n p o  status  · Continue IV fluids per primary team   · Pain management per primary team   · Monitor off antibiotics  · Monitor hemodynamics, LFTs daily  · Further recommendations based on findings  Rest of care per primary team   Thank you for this consultation  ______________________________________________________________________    HPI:  33-year-old female with past medical history of obesity who is admitted for early cholecystitis  She is postop day 1 from cholecystectomy  GI was consulted as intraoperative cholangiogram was positive for filling defect in the CBD  He originally presented with recurrent right upper quadrant abdominal pain  Ultrasound showed early signs of cholecystitis  She underwent cholecystectomy on 2/9/2023  Since then she is stated that her pain has slightly worsened in the right upper quadrant  Denies any fever, chills, nausea, vomiting  Rest of ROS was negative  REVIEW OF SYSTEMS:    Review of Systems   Constitutional: Negative for chills and fever     HENT: Negative for congestion and sinus pressure  Respiratory: Negative for cough and shortness of breath  Cardiovascular: Negative for chest pain, palpitations and leg swelling  Gastrointestinal: Positive for abdominal pain  Negative for diarrhea, nausea and vomiting  Genitourinary: Negative for dysuria and hematuria  Musculoskeletal: Negative for arthralgias and back pain  Skin: Negative for color change and rash  Neurological: Negative for dizziness and headaches  Psychiatric/Behavioral: Negative for agitation and confusion  All other systems reviewed and are negative  Historical Information   Past Medical History:   Diagnosis Date   • Obesity (BMI 35 0-39 9 without comorbidity) 2022     Past Surgical History:   Procedure Laterality Date   • MYRINGOTOMY W/ TUBES     • NM  DELIVERY ONLY N/A 12/10/2022    Procedure:  SECTION ();   Surgeon: Garrick Llanos DO;  Location: [facility];  Service: Obstetrics     Social History   Social History     Substance and Sexual Activity   Alcohol Use Never     Social History     Substance and Sexual Activity   Drug Use Never     Social History     Tobacco Use   Smoking Status Never   Smokeless Tobacco Never     Family History   Problem Relation Age of Onset   • Hyperlipidemia Mother    • No Known Problems Father        Meds/Allergies     Medications Prior to Admission   Medication   • ondansetron (ZOFRAN-ODT) 4 mg disintegrating tablet   • pantoprazole (PROTONIX) 40 mg tablet   • docusate sodium (COLACE) 50 mg capsule   • naproxen (Naprosyn) 500 mg tablet   • oxyCODONE-acetaminophen (PERCOCET) 5-325 mg per tablet     Current Facility-Administered Medications   Medication Dose Route Frequency   • ceFAZolin (ANCEF) IVPB (premix in dextrose) 2,000 mg 50 mL  2,000 mg Intravenous Q8H   • enoxaparin (LOVENOX) subcutaneous injection 40 mg  40 mg Subcutaneous Daily   • HYDROmorphone (DILAUDID) injection 0 5 mg  0 5 mg Intravenous Q4H PRN   • lactated ringers infusion  125 mL/hr Intravenous Continuous   • metroNIDAZOLE (FLAGYL) IVPB (premix) 500 mg 100 mL  500 mg Intravenous Q8H   • ondansetron (ZOFRAN) injection 4 mg  4 mg Intravenous Q6H PRN   • oxyCODONE (ROXICODONE) immediate release tablet 10 mg  10 mg Oral Q4H PRN   • oxyCODONE (ROXICODONE) IR tablet 5 mg  5 mg Oral Q4H PRN   • pantoprazole (PROTONIX) injection 40 mg  40 mg Intravenous Q24H GEM       No Known Allergies        Objective     Blood pressure 125/65, pulse 76, temperature 98 1 °F (36 7 °C), resp  rate 18, height 5' 6" (1 676 m), weight 96 4 kg (212 lb 8 4 oz), SpO2 94 %, not currently breastfeeding  Body mass index is 34 3 kg/m²  Intake/Output Summary (Last 24 hours) at 2/10/2023 0640  Last data filed at 2/10/2023 7154  Gross per 24 hour   Intake 4801 24 ml   Output --   Net 4801 24 ml         PHYSICAL EXAM:      Physical Exam  Vitals and nursing note reviewed  Constitutional:       General: She is not in acute distress  Appearance: Normal appearance  She is obese  She is not ill-appearing  HENT:      Head: Normocephalic and atraumatic  Mouth/Throat:      Mouth: Mucous membranes are moist    Eyes:      Extraocular Movements: Extraocular movements intact  Conjunctiva/sclera: Conjunctivae normal    Cardiovascular:      Pulses: Normal pulses  Pulmonary:      Effort: Pulmonary effort is normal    Abdominal:      General: Abdomen is flat  Bowel sounds are normal  There is no distension  Palpations: Abdomen is soft  Tenderness: There is abdominal tenderness in the right upper quadrant  There is no guarding  Skin:     General: Skin is warm and dry  Neurological:      General: No focal deficit present  Mental Status: She is alert and oriented to person, place, and time     Psychiatric:         Mood and Affect: Mood normal          Behavior: Behavior normal           Lab Results:   Admission on 02/08/2023   Component Date Value   • WBC 02/08/2023 14 81 (H)    • RBC 02/08/2023 4 90 • Hemoglobin 02/08/2023 12 1    • Hematocrit 02/08/2023 39 6    • MCV 02/08/2023 81 (L)    • MCH 02/08/2023 24 7 (L)    • MCHC 02/08/2023 30 6 (L)    • RDW 02/08/2023 14 1    • MPV 02/08/2023 10 7    • Platelets 11/19/5599 345    • nRBC 02/08/2023 0    • Neutrophils Relative 02/08/2023 87 (H)    • Immat GRANS % 02/08/2023 0    • Lymphocytes Relative 02/08/2023 10 (L)    • Monocytes Relative 02/08/2023 3 (L)    • Eosinophils Relative 02/08/2023 0    • Basophils Relative 02/08/2023 0    • Neutrophils Absolute 02/08/2023 12 80 (H)    • Immature Grans Absolute 02/08/2023 0 05    • Lymphocytes Absolute 02/08/2023 1 46    • Monocytes Absolute 02/08/2023 0 45    • Eosinophils Absolute 02/08/2023 0 00    • Basophils Absolute 02/08/2023 0 05    • Protime 02/08/2023 13 5    • INR 02/08/2023 1 03    • PTT 02/08/2023 31    • Sodium 02/08/2023 136    • Potassium 02/08/2023 4 1    • Chloride 02/08/2023 104    • CO2 02/08/2023 22    • ANION GAP 02/08/2023 10    • BUN 02/08/2023 8    • Creatinine 02/08/2023 0 69    • Glucose 02/08/2023 108    • Calcium 02/08/2023 9 8    • AST 02/08/2023 135 (H)    • ALT 02/08/2023 49    • Alkaline Phosphatase 02/08/2023 173 (H)    • Total Protein 02/08/2023 8 0    • Albumin 02/08/2023 4 4    • Total Bilirubin 02/08/2023 0 66    • eGFR 02/08/2023 126    • Magnesium 02/08/2023 2 0    • Lipase 02/08/2023 20    • LACTIC ACID 02/08/2023 1 2    • EXT Preg Test, Ur 02/08/2023 Negative    • Control 02/08/2023 Valid    • Color, UA 02/08/2023 Yellow    • Clarity, UA 02/08/2023 Clear    • pH, UA 02/08/2023 6 5    • Leukocytes, UA 02/08/2023 Negative    • Nitrite, UA 02/08/2023 Negative    • Protein, UA 02/08/2023 Negative    • Glucose, UA 02/08/2023 Negative    • Ketones, UA 02/08/2023 Negative    • Urobilinogen, UA 02/08/2023 0 2    • Bilirubin, UA 02/08/2023 Negative    • Occult Blood, UA 02/08/2023 Negative    • Specific Gravity, UA 02/08/2023 1 025    • SARS-CoV-2 02/09/2023 Negative    • INFLUENZA A PCR 02/09/2023 Negative    • INFLUENZA B PCR 02/09/2023 Negative    • RSV PCR 02/09/2023 Negative    • Sodium 02/09/2023 137    • Potassium 02/09/2023 3 7    • Chloride 02/09/2023 105    • CO2 02/09/2023 26    • ANION GAP 02/09/2023 6    • BUN 02/09/2023 6    • Creatinine 02/09/2023 0 74    • Glucose 02/09/2023 97    • Glucose, Fasting 02/09/2023 97    • Calcium 02/09/2023 9 2    • AST 02/09/2023 163 (H)    • ALT 02/09/2023 68 (H)    • Alkaline Phosphatase 02/09/2023 144 (H)    • Total Protein 02/09/2023 6 9    • Albumin 02/09/2023 3 7    • Total Bilirubin 02/09/2023 0 66    • eGFR 02/09/2023 117    • WBC 02/09/2023 10 61 (H)    • RBC 02/09/2023 4 53    • Hemoglobin 02/09/2023 11 0 (L)    • Hematocrit 02/09/2023 35 8    • MCV 02/09/2023 79 (L)    • MCH 02/09/2023 24 3 (L)    • MCHC 02/09/2023 30 7 (L)    • RDW 02/09/2023 14 2    • MPV 02/09/2023 11 4    • Platelets 55/24/9759 338    • nRBC 02/09/2023 0    • Neutrophils Relative 02/09/2023 67    • Immat GRANS % 02/09/2023 1    • Lymphocytes Relative 02/09/2023 23    • Monocytes Relative 02/09/2023 8    • Eosinophils Relative 02/09/2023 0    • Basophils Relative 02/09/2023 1    • Neutrophils Absolute 02/09/2023 7 26    • Immature Grans Absolute 02/09/2023 0 05    • Lymphocytes Absolute 02/09/2023 2 40    • Monocytes Absolute 02/09/2023 0 81    • Eosinophils Absolute 02/09/2023 0 04    • Basophils Absolute 02/09/2023 0 05    • ABO Grouping 02/09/2023 AB    • Rh Factor 02/09/2023 Positive    • Antibody Screen 02/09/2023 Negative    • Specimen Expiration Date 02/09/2023 [id number]    • Sodium 02/10/2023 137    • Potassium 02/10/2023 3 9    • Chloride 02/10/2023 104    • CO2 02/10/2023 26    • ANION GAP 02/10/2023 7    • BUN 02/10/2023 6    • Creatinine 02/10/2023 0 73    • Glucose 02/10/2023 90    • Glucose, Fasting 02/10/2023 90    • Calcium 02/10/2023 8 9    • AST 02/10/2023 192 (H)    • ALT 02/10/2023 90 (H)    • Alkaline Phosphatase 02/10/2023 157 (H)    • Total Protein 02/10/2023 6 3 (L)    • Albumin 02/10/2023 3 5    • Total Bilirubin 02/10/2023 0 46    • eGFR 02/10/2023 119        Imaging Studies: I have personally reviewed pertinent imaging studies  [address] [facility] INDERJIT LANDERS    Gastroenterology Fellow  PGY-4  Available via [employer]  2/10/2023 6:40 AM

## 2023-02-10 NOTE — PROGRESS NOTES
Progress Note - General Surgery   Mahesh Mayfield 21 y o  female MRN: [id number]  Unit/Bed#: [place] [address] -02 Encounter: [id number]    Assessment:  POD#1 s/p laparoscopic cholecystectomy with positive IOC, unable to flush gallstone from CBD    Plan:  • GI consulted for postoperative ERCP  • NPO since MN  • IVF while NPO  • PRN analgesia  • OOB/ambulate  • DVT PPx    Subjective/Objective     Subjective:   No acute events overnight  Some louie-incisional soreness, but "deeper" RUQ pain has resolved  Tolerated clears  Objective:    Blood pressure 125/65, pulse 76, temperature 98 1 °F (36 7 °C), resp  rate 18, height 5' 6" (1 676 m), weight 96 4 kg (212 lb 8 4 oz), SpO2 94 %, not currently breastfeeding  ,Body mass index is 34 3 kg/m²        Intake/Output Summary (Last 24 hours) at 2/10/2023 0706  Last data filed at 2/10/2023 8986  Gross per 24 hour   Intake 4801 24 ml   Output --   Net 4801 24 ml       Invasive Devices     Peripheral Intravenous Line  Duration           Peripheral IV 02/08/23 Right Wrist 1 day                Physical Exam:   Gen:  NAD  CV:  warm, well-perfused  Lungs: nl effort  Abd:  soft, non-distended, appropriately tender, incisions C/D/I   Ext:  no CCE  Neuro: A&Ox3     Results from last 7 days   Lab Units 02/10/23  0514 02/09/23  0516 02/08/23  2142   WBC Thousand/uL 12 28* 10 61* 14 81*   HEMOGLOBIN g/dL 9 9* 11 0* 12 1   HEMATOCRIT % 32 7* 35 8 39 6   PLATELETS Thousands/uL 269 338 345     Results from last 7 days   Lab Units 02/10/23  0514 02/09/23  0516 02/08/23  2142   POTASSIUM mmol/L 3 9 3 7 4 1   CHLORIDE mmol/L 104 105 104   CO2 mmol/L 26 26 22   BUN mg/dL 6 6 8   CREATININE mg/dL 0 73 0 74 0 69   CALCIUM mg/dL 8 9 9 2 9 8     Results from last 7 days   Lab Units 02/08/23  2142 02/03/23  2345   INR  1 03 0 93   PTT seconds 31 21*

## 2023-02-10 NOTE — ANESTHESIA PREPROCEDURE EVALUATION
Procedure:  ERCP    Relevant Problems   ANESTHESIA (within normal limits)      NEURO/PSYCH   (+) Recurrent biliary colic        Physical Exam    Airway    Mallampati score: II  TM Distance: >3 FB  Neck ROM: full     Dental   No notable dental hx     Cardiovascular  Rhythm: regular, Rate: normal, Cardiovascular exam normal    Pulmonary  Pulmonary exam normal Breath sounds clear to auscultation,     Other Findings        Anesthesia Plan  ASA Score- 2 Emergent    Anesthesia Type- general with ASA Monitors  Additional Monitors:   Airway Plan: ETT  Plan Factors-Exercise tolerance (METS): >4 METS  Chart reviewed  Existing labs reviewed  Patient summary reviewed  Patient is not a current smoker  Induction- intravenous  Postoperative Plan-     Informed Consent- Anesthetic plan and risks discussed with patient

## 2023-02-10 NOTE — QUICK NOTE
General Surgery Post-Op Check  Usman Search Giuseppe 21 y o  female MRN: [id number]  Unit/Bed#: [place] [place] [address] 210-02 Encounter: [id number]     S: Doing well      O:   Vitals:    02/09/23 1722   BP: 143/78   Pulse: 80   Resp: 20   Temp:    SpO2: 97%     I/O       02/08 0701  02/09 0700 02/09 0701  02/10 0700    I V  (mL/kg)  3050 (31 6)    IV Piggyback 50 20    Total Intake(mL/kg) 50 (0 5) 3070 (31 8)    Net +50 +3070              PE:  Gen:  NAD  HENT: MMM  CV:  warm, well-perfused  Lung:  normal effort  Abd:  soft, non-distended, appropriately tender, incisions C/D/I   Ext:  no CCE  Neuro:  A&Ox3, M/S grossly intact     Lab Results   Component Value Date    WBC 10 61 (H) 02/09/2023    HGB 11 0 (L) 02/09/2023    HCT 35 8 02/09/2023    MCV 79 (L) 02/09/2023     02/09/2023     Lab Results   Component Value Date    CALCIUM 9 2 02/09/2023    K 3 7 02/09/2023    CO2 26 02/09/2023     02/09/2023    BUN 6 02/09/2023    CREATININE 0 74 02/09/2023         A/P: 21 y o  female Day of Surgery s/p Procedure(s) (LRB):  CHOLECYSTECTOMY LAPAROSCOPIC WITH INTRAOPERATIVE CHOLANGIOGRAM (N/A)  • GI consult for ERCP  • NPO p MN  • Trend LFTs  • DVT PPx      Tosin Davey MD  PGY4, General Surgery

## 2023-02-10 NOTE — ANESTHESIA POSTPROCEDURE EVALUATION
Post-Op Assessment Note    CV Status:  Stable    Pain management: adequate     Mental Status:  Alert and awake   Hydration Status:  Euvolemic   PONV Controlled:  Controlled   Airway Patency:  Patent      Post Op Vitals Reviewed: Yes      Staff: Anesthesiologist         No notable events documented      BP      Temp      Pulse     Resp      SpO2      /78   Pulse 80   Temp 98 7 °F (37 1 °C) (Temporal)   Resp 20   Ht 5' 6" (1 676 m)   Wt 96 4 kg (212 lb 8 4 oz)   LMP  (LMP Unknown)   SpO2 97%   Breastfeeding No   BMI 34 30 kg/m²

## 2023-02-11 VITALS
TEMPERATURE: 98.2 F | HEIGHT: 66 IN | DIASTOLIC BLOOD PRESSURE: 85 MMHG | SYSTOLIC BLOOD PRESSURE: 144 MMHG | OXYGEN SATURATION: 95 % | RESPIRATION RATE: 20 BRPM | WEIGHT: 212.52 LBS | HEART RATE: 57 BPM | BODY MASS INDEX: 34.16 KG/M2

## 2023-02-11 LAB
ALBUMIN SERPL BCP-MCNC: 3.3 G/DL (ref 3.5–5)
ALP SERPL-CCNC: 126 U/L (ref 34–104)
ALT SERPL W P-5'-P-CCNC: 49 U/L (ref 7–52)
ANION GAP SERPL CALCULATED.3IONS-SCNC: 6 MMOL/L (ref 4–13)
AST SERPL W P-5'-P-CCNC: 57 U/L (ref 13–39)
BILIRUB SERPL-MCNC: 0.32 MG/DL (ref 0.2–1)
BUN SERPL-MCNC: 5 MG/DL (ref 5–25)
CALCIUM ALBUM COR SERPL-MCNC: 9.3 MG/DL (ref 8.3–10.1)
CALCIUM SERPL-MCNC: 8.7 MG/DL (ref 8.4–10.2)
CHLORIDE SERPL-SCNC: 104 MMOL/L (ref 96–108)
CO2 SERPL-SCNC: 27 MMOL/L (ref 21–32)
CREAT SERPL-MCNC: 0.66 MG/DL (ref 0.6–1.3)
ERYTHROCYTE [DISTWIDTH] IN BLOOD BY AUTOMATED COUNT: 14.1 % (ref 11.6–15.1)
GFR SERPL CREATININE-BSD FRML MDRD: 128 ML/MIN/1.73SQ M
GLUCOSE SERPL-MCNC: 72 MG/DL (ref 65–140)
HCT VFR BLD AUTO: 30.7 % (ref 34.8–46.1)
HGB BLD-MCNC: 9.3 G/DL (ref 11.5–15.4)
MCH RBC QN AUTO: 24.8 PG (ref 26.8–34.3)
MCHC RBC AUTO-ENTMCNC: 30.3 G/DL (ref 31.4–37.4)
MCV RBC AUTO: 82 FL (ref 82–98)
PLATELET # BLD AUTO: 211 THOUSANDS/UL (ref 149–390)
PMV BLD AUTO: 11.2 FL (ref 8.9–12.7)
POTASSIUM SERPL-SCNC: 3.6 MMOL/L (ref 3.5–5.3)
PROT SERPL-MCNC: 6.1 G/DL (ref 6.4–8.4)
RBC # BLD AUTO: 3.75 MILLION/UL (ref 3.81–5.12)
SODIUM SERPL-SCNC: 137 MMOL/L (ref 135–147)
WBC # BLD AUTO: 7.36 THOUSAND/UL (ref 4.31–10.16)

## 2023-02-11 RX ADMIN — PANTOPRAZOLE SODIUM 40 MG: 40 INJECTION, POWDER, FOR SOLUTION INTRAVENOUS at 09:31

## 2023-02-11 NOTE — PLAN OF CARE
Problem: PAIN - ADULT  Goal: Verbalizes/displays adequate comfort level or baseline comfort level  Description: Interventions:  - Encourage patient to monitor pain and request assistance  - Assess pain using appropriate pain scale  - Administer analgesics based on type and severity of pain and evaluate response  - Implement non-pharmacological measures as appropriate and evaluate response  - Consider cultural and social influences on pain and pain management  - Notify physician/advanced practitioner if interventions unsuccessful or patient reports new pain  2/11/2023 1135 by Carmine Fitzpatrick RN  Outcome: Progressing  2/11/2023 1133 by Carmine Fitzpatrick RN  Outcome: Progressing     Problem: INFECTION - ADULT  Goal: Absence or prevention of progression during hospitalization  Description: INTERVENTIONS:  - Assess and monitor for signs and symptoms of infection  - Monitor lab/diagnostic results  - Monitor all insertion sites, i e  indwelling lines, tubes, and drains  - Monitor endotracheal if appropriate and nasal secretions for changes in amount and color  - [place] appropriate cooling/warming therapies per order  - Administer medications as ordered  - Instruct and encourage patient and family to use good hand hygiene technique  - Identify and instruct in appropriate isolation precautions for identified infection/condition  2/11/2023 1135 by Carmine Fitzpatrick RN  Outcome: Progressing  2/11/2023 1133 by Carmine Fitzpatrick RN  Outcome: Progressing  Goal: Absence of fever/infection during neutropenic period  Description: INTERVENTIONS:  - Monitor WBC    2/11/2023 1135 by Carmine Fitzpatrick RN  Outcome: Progressing  2/11/2023 1133 by Carmine Fitzpatrick RN  Outcome: Progressing     Problem: SAFETY ADULT  Goal: Patient will remain free of falls  Description: INTERVENTIONS:  - Educate patient/family on patient safety including physical limitations  - Instruct patient to call for assistance with activity   - Consult OT/PT to assist with strengthening/mobility   - Keep Call bell within reach  - Keep bed low and locked with side rails adjusted as appropriate  - Keep care items and personal belongings within reach  - Initiate and maintain comfort rounds  - Make Fall Risk Sign visible to staff  - Apply yellow socks and bracelet for high fall risk patients  - Consider moving patient to room near nurses station  2/11/2023 1135 by Bogdan Carlin RN  Outcome: Progressing  2/11/2023 1133 by Bogdan Carlin RN  Outcome: Progressing  Goal: Maintain or return to baseline ADL function  Description: INTERVENTIONS:  -  Assess patient's ability to carry out ADLs; assess patient's baseline for ADL function and identify physical deficits which impact ability to perform ADLs (bathing, care of mouth/teeth, toileting, grooming, dressing, etc )  - Assess/evaluate cause of self-care deficits   - Assess range of motion  - Assess patient's mobility; develop plan if impaired  - Assess patient's need for assistive devices and provide as appropriate  - Encourage maximum independence but intervene and supervise when necessary  - Involve family in performance of ADLs  - Assess for home care needs following discharge   - Consider OT consult to assist with ADL evaluation and planning for discharge  - Provide patient education as appropriate  2/11/2023 1135 by Bogdan Carlin RN  Outcome: Progressing  2/11/2023 1133 by Bogdan Carlin RN  Outcome: Progressing  Goal: Maintains/Returns to pre admission functional level  Description: INTERVENTIONS:  - Perform BMAT or MOVE assessment daily    - Set and communicate daily mobility goal to care team and patient/family/caregiver     - Collaborate with rehabilitation services on mobility goals if consulted  - Out of bed for toileting  - Record patient progress and toleration of activity level   2/11/2023 1135 by Bogdan Carlin RN  Outcome: Progressing  2/11/2023 1133 by Bogdan Carlin RN  Outcome: Progressing Problem: DISCHARGE PLANNING  Goal: Discharge to home or other facility with appropriate resources  Description: INTERVENTIONS:  - Identify barriers to discharge w/patient and caregiver  - Arrange for needed discharge resources and transportation as appropriate  - Identify discharge learning needs (meds, wound care, etc )  - Arrange for interpretive services to assist at discharge as needed  - Refer to Case Management Department for coordinating discharge planning if the patient needs post-hospital services based on physician/advanced practitioner order or complex needs related to functional status, cognitive ability, or social support system  2/11/2023 1135 by Kyleigh Gupta RN  Outcome: Progressing  2/11/2023 1133 by Kyleigh Gupat RN  Outcome: Progressing     Problem: Knowledge Deficit  Goal: Patient/family/caregiver demonstrates understanding of disease process, treatment plan, medications, and discharge instructions  Description: Complete learning assessment and assess knowledge base    Interventions:  - Provide teaching at level of understanding  - Provide teaching via preferred learning methods  2/11/2023 1135 by Kyleigh Gupta RN  Outcome: Progressing  2/11/2023 1133 by Kyleigh Gupta RN  Outcome: Progressing

## 2023-02-11 NOTE — PROGRESS NOTES
Progress Note - General Surgery   Hubert Tapia 21 y o  female MRN: [id number]  Unit/Bed#: [place] [address] -02 Encounter: [id number]    Assessment:  POD#2 s/p laparoscopic cholecystectomy with positive IOC  Now s/p ERCP with stone extraction and sphincterotomy    Plan:  • Advance to lo fat diet as tolerated  • PRN analgesia  • OOB/ambulate  • Discharge home today    Subjective/Objective     Subjective:   No acute events overnight  Incisional pain is well-controlled  Tolerated clear liquid diet last night  Objective:    Blood pressure 146/92, pulse 67, temperature 98 °F (36 7 °C), resp  rate 18, height 5' 6" (1 676 m), weight 96 4 kg (212 lb 8 4 oz), SpO2 97 %, not currently breastfeeding  ,Body mass index is 34 3 kg/m²        Intake/Output Summary (Last 24 hours) at 2/11/2023 0743  Last data filed at 2/10/2023 1820  Gross per 24 hour   Intake 500 ml   Output 0 ml   Net 500 ml       Invasive Devices     Peripheral Intravenous Line  Duration           Peripheral IV 02/08/23 Right Wrist 2 days                Physical Exam:   Gen:  NAD  CV:  warm, well-perfused  Lungs: nl effort  Abd:  soft, non-distended, minimal louie-incisional tenderness, incisions C/D/I   Ext:  no CCE  Neuro: A&Ox3     Results from last 7 days   Lab Units 02/11/23  0446 02/10/23  0514 02/09/23  0516   WBC Thousand/uL 7 36 12 28* 10 61*   HEMOGLOBIN g/dL 9 3* 9 9* 11 0*   HEMATOCRIT % 30 7* 32 7* 35 8   PLATELETS Thousands/uL 211 269 338     Results from last 7 days   Lab Units 02/11/23 0446 02/10/23  0514 02/09/23  0516   POTASSIUM mmol/L 3 6 3 9 3 7   CHLORIDE mmol/L 104 104 105   CO2 mmol/L 27 26 26   BUN mg/dL 5 6 6   CREATININE mg/dL 0 66 0 73 0 74   CALCIUM mg/dL 8 7 8 9 9 2     Results from last 7 days   Lab Units 02/08/23  2142   INR  1 03   PTT seconds 31

## 2023-02-11 NOTE — NURSING NOTE
Patient was d/c'd to home in no distress  No c/o pain, able to make needs known  Instructions were reviewed with the patient and made aware that prescription was sent to pharmacy of choice and ready for pickup  Patient was transferred to the front entrance via wheelchair and transferred into the car with all safety precautions taken  All belongings left with the patient

## 2023-02-11 NOTE — PLAN OF CARE
Problem: PAIN - ADULT  Goal: Verbalizes/displays adequate comfort level or baseline comfort level  Description: Interventions:  - Encourage patient to monitor pain and request assistance  - Assess pain using appropriate pain scale  - Administer analgesics based on type and severity of pain and evaluate response  - Implement non-pharmacological measures as appropriate and evaluate response  - Consider cultural and social influences on pain and pain management  - Notify physician/advanced practitioner if interventions unsuccessful or patient reports new pain  Outcome: Progressing     Problem: INFECTION - ADULT  Goal: Absence or prevention of progression during hospitalization  Description: INTERVENTIONS:  - Assess and monitor for signs and symptoms of infection  - Monitor lab/diagnostic results  - Monitor all insertion sites, i e  indwelling lines, tubes, and drains  - Monitor endotracheal if appropriate and nasal secretions for changes in amount and color  - [place] appropriate cooling/warming therapies per order  - Administer medications as ordered  - Instruct and encourage patient and family to use good hand hygiene technique  - Identify and instruct in appropriate isolation precautions for identified infection/condition  Outcome: Progressing  Goal: Absence of fever/infection during neutropenic period  Description: INTERVENTIONS:  - Monitor WBC    Outcome: Progressing     Problem: SAFETY ADULT  Goal: Patient will remain free of falls  Description: INTERVENTIONS:  - Educate patient/family on patient safety including physical limitations  - Instruct patient to call for assistance with activity   - Consult OT/PT to assist with strengthening/mobility   - Keep Call bell within reach  - Keep bed low and locked with side rails adjusted as appropriate  - Keep care items and personal belongings within reach  - Initiate and maintain comfort rounds  - Make Fall Risk Sign visible to staff  - Offer Toileting every *** Hours, in advance of need  - Initiate/Maintain ***alarm  - Obtain necessary fall risk management equipment: ***  - Apply yellow socks and bracelet for high fall risk patients  - Consider moving patient to room near nurses station  Outcome: Progressing  Goal: Maintain or return to baseline ADL function  Description: INTERVENTIONS:  -  Assess patient's ability to carry out ADLs; assess patient's baseline for ADL function and identify physical deficits which impact ability to perform ADLs (bathing, care of mouth/teeth, toileting, grooming, dressing, etc )  - Assess/evaluate cause of self-care deficits   - Assess range of motion  - Assess patient's mobility; develop plan if impaired  - Assess patient's need for assistive devices and provide as appropriate  - Encourage maximum independence but intervene and supervise when necessary  - Involve family in performance of ADLs  - Assess for home care needs following discharge   - Consider OT consult to assist with ADL evaluation and planning for discharge  - Provide patient education as appropriate  Outcome: Progressing  Goal: Maintains/Returns to pre admission functional level  Description: INTERVENTIONS:  - Perform BMAT or MOVE assessment daily    - Set and communicate daily mobility goal to care team and patient/family/caregiver  - Collaborate with rehabilitation services on mobility goals if consulted  - Perform Range of Motion *** times a day  - Reposition patient every *** hours    - Dangle patient *** times a day  - Stand patient *** times a day  - Ambulate patient *** times a day  - Out of bed to chair *** times a day   - Out of bed for meals *** times a day  - Out of bed for toileting  - Record patient progress and toleration of activity level   Outcome: Progressing     Problem: DISCHARGE PLANNING  Goal: Discharge to home or other facility with appropriate resources  Description: INTERVENTIONS:  - Identify barriers to discharge w/patient and caregiver  - Arrange for needed discharge resources and transportation as appropriate  - Identify discharge learning needs (meds, wound care, etc )  - Arrange for interpretive services to assist at discharge as needed  - Refer to Case Management Department for coordinating discharge planning if the patient needs post-hospital services based on physician/advanced practitioner order or complex needs related to functional status, cognitive ability, or social support system  Outcome: Progressing     Problem: Knowledge Deficit  Goal: Patient/family/caregiver demonstrates understanding of disease process, treatment plan, medications, and discharge instructions  Description: Complete learning assessment and assess knowledge base    Interventions:  - Provide teaching at level of understanding  - Provide teaching via preferred learning methods  Outcome: Progressing

## 2023-02-11 NOTE — DISCHARGE SUMMARY
Discharge Summary - General Surgery   Patti Camara 21 y o  female MRN: [id number]  Unit/Bed#: [facility] 2 -02 Encounter: [id number]    Admission Date: 2/8/2023     Discharge Date:  2/11/2023     Admitting Diagnosis: Cholelithiasis [F38 81]  Biliary colic [R58 08]  Abdominal pain [R10 9]    Discharge Diagnosis: Principal Problem:    Recurrent biliary colic  Resolved Problems:    * No resolved hospital problems  *      Attending and Service:   Demetri Muniz MD; General Surgery    Consulting Physician(s):   Gastroenterology    Procedures Performed:   1  CHOLECYSTECTOMY LAPAROSCOPIC WITH INTRAOPERATIVE CHOLANGIOGRAM   2  ERCP WITH SPHINCTEROTOMY    Imaging:  No results found  Hospital Course:   HPI, per Karin Nelson MD: "Patti Camara is a 21 y o  female w PMH of c section 12/10/22 who presents with recurrent biliary colic  Has been to ED multiple times w similar complaints however no s/o acute cholecystitis on imaging and was referred for outpatient follow up  However she tried bland non fat diet, but around lunch yesterday developed persistent RUQ pain associated w nausea and vomiting, worse pain than prior which did not resolve  Denied any fever or chills  No cp or sob  She is in significant discomfort and requesting cholecystectomy "    The patient was taken to the operating room on 2/9/23 for performance of the above-stated procedures  Intraoperative findings included:   • Cholelithiasis  • Filling defect seen on IOC, c/w choledocholithiasis    The choledocholith was unable to be flushed from the CBD, so GI was consulted postoperatively for ERCP  This was performed on POD#1 and showed two small stones and sludge, which were cleared  Sphincterotomy was performed  The patient remained admitted for an additional night following ERCP and was doing well on POD#2 and deemed stable for discharge  Patient was discharged on HD#0/POD#2   On the day of discharge, the patient was voiding spontaneously, ambulating at baseline, and pain was well controlled  The patient was sent home with medication for pain  She understood all instructions for discharge  She was also given the names and numbers of the providers as well as instructions for follow up appointments  Condition at Discharge: good     Discharge instructions/Information to patient and family:   See after visit summary for information provided to patient and family  Provisions for Follow-Up Care:  See after visit summary for information related to follow-up care and any pertinent home health orders  Disposition: Home    Planned Readmission: No    Discharge Statement   I spent 30 minutes discharging the patient  This time was spent on the day of discharge  I had direct contact with the patient on the day of discharge  Additional documentation is required if more than 30 minutes were spent on discharge  Discharge Medications:  See after visit summary for reconciled discharge medications provided to patient and family        Kamini Traore MD  2/11/2023  7:47 AM

## 2023-02-11 NOTE — ANESTHESIA POSTPROCEDURE EVALUATION
Post-Op Assessment Note    CV Status:  Stable    Pain management: adequate     Mental Status:  Alert and awake   Hydration Status:  Euvolemic   PONV Controlled:  Controlled   Airway Patency:  Patent      Post Op Vitals Reviewed: Yes      Staff: Anesthesiologist         No notable events documented      /78 (02/10/23 1942)    Temp     Pulse (!) 54 (02/10/23 1942)   Resp      SpO2 98 % (02/10/23 1942)

## 2023-02-11 NOTE — PLAN OF CARE
Problem: PAIN - ADULT  Goal: Verbalizes/displays adequate comfort level or baseline comfort level  Description: Interventions:  - Encourage patient to monitor pain and request assistance  - Assess pain using appropriate pain scale  - Administer analgesics based on type and severity of pain and evaluate response  - Implement non-pharmacological measures as appropriate and evaluate response  - Consider cultural and social influences on pain and pain management  - Notify physician/advanced practitioner if interventions unsuccessful or patient reports new pain  Outcome: Progressing     Problem: INFECTION - ADULT  Goal: Absence or prevention of progression during hospitalization  Description: INTERVENTIONS:  - Assess and monitor for signs and symptoms of infection  - Monitor lab/diagnostic results  - Monitor all insertion sites, i e  indwelling lines, tubes, and drains  - Monitor endotracheal if appropriate and nasal secretions for changes in amount and color  - [place] appropriate cooling/warming therapies per order  - Administer medications as ordered  - Instruct and encourage patient and family to use good hand hygiene technique  - Identify and instruct in appropriate isolation precautions for identified infection/condition  Outcome: Progressing  Goal: Absence of fever/infection during neutropenic period  Description: INTERVENTIONS:  - Monitor WBC    Outcome: Progressing     Problem: SAFETY ADULT  Goal: Patient will remain free of falls  Description: INTERVENTIONS:  - Educate patient/family on patient safety including physical limitations  - Instruct patient to call for assistance with activity   - Consult OT/PT to assist with strengthening/mobility   - Keep Call bell within reach  - Keep bed low and locked with side rails adjusted as appropriate  - Keep care items and personal belongings within reach  - Initiate and maintain comfort rounds  - Make Fall Risk Sign visible to staff  - Apply yellow socks and bracelet for high fall risk patients  - Consider moving patient to room near nurses station  Outcome: Progressing  Goal: Maintain or return to baseline ADL function  Description: INTERVENTIONS:  -  Assess patient's ability to carry out ADLs; assess patient's baseline for ADL function and identify physical deficits which impact ability to perform ADLs (bathing, care of mouth/teeth, toileting, grooming, dressing, etc )  - Assess/evaluate cause of self-care deficits   - Assess range of motion  - Assess patient's mobility; develop plan if impaired  - Assess patient's need for assistive devices and provide as appropriate  - Encourage maximum independence but intervene and supervise when necessary  - Involve family in performance of ADLs  - Assess for home care needs following discharge   - Consider OT consult to assist with ADL evaluation and planning for discharge  - Provide patient education as appropriate  Outcome: Progressing  Goal: Maintains/Returns to pre admission functional level  Description: INTERVENTIONS:  - Perform BMAT or MOVE assessment daily    - Set and communicate daily mobility goal to care team and patient/family/caregiver     - Collaborate with rehabilitation services on mobility goals if consulted  - Out of bed for toileting  - Record patient progress and toleration of activity level   Outcome: Progressing     Problem: DISCHARGE PLANNING  Goal: Discharge to home or other facility with appropriate resources  Description: INTERVENTIONS:  - Identify barriers to discharge w/patient and caregiver  - Arrange for needed discharge resources and transportation as appropriate  - Identify discharge learning needs (meds, wound care, etc )  - Arrange for interpretive services to assist at discharge as needed  - Refer to Case Management Department for coordinating discharge planning if the patient needs post-hospital services based on physician/advanced practitioner order or complex needs related to functional status, cognitive ability, or social support system  Outcome: Progressing     Problem: Knowledge Deficit  Goal: Patient/family/caregiver demonstrates understanding of disease process, treatment plan, medications, and discharge instructions  Description: Complete learning assessment and assess knowledge base    Interventions:  - Provide teaching at level of understanding  - Provide teaching via preferred learning methods  Outcome: Progressing

## 2023-02-11 NOTE — PLAN OF CARE
Problem: PAIN - ADULT  Goal: Verbalizes/displays adequate comfort level or baseline comfort level  Description: Interventions:  - Encourage patient to monitor pain and request assistance  - Assess pain using appropriate pain scale  - Administer analgesics based on type and severity of pain and evaluate response  - Implement non-pharmacological measures as appropriate and evaluate response  - Consider cultural and social influences on pain and pain management  - Notify physician/advanced practitioner if interventions unsuccessful or patient reports new pain  2/11/2023 1247 by Edwin Garibay RN  Outcome: Adequate for Discharge  2/11/2023 1135 by Edwin aGribay RN  Outcome: Progressing  2/11/2023 1133 by dEwin Garibay RN  Outcome: Progressing     Problem: INFECTION - ADULT  Goal: Absence or prevention of progression during hospitalization  Description: INTERVENTIONS:  - Assess and monitor for signs and symptoms of infection  - Monitor lab/diagnostic results  - Monitor all insertion sites, i e  indwelling lines, tubes, and drains  - Monitor endotracheal if appropriate and nasal secretions for changes in amount and color  - [place] appropriate cooling/warming therapies per order  - Administer medications as ordered  - Instruct and encourage patient and family to use good hand hygiene technique  - Identify and instruct in appropriate isolation precautions for identified infection/condition  2/11/2023 1247 by Edwin Garibay RN  Outcome: Adequate for Discharge  2/11/2023 1135 by Edwin Garibay RN  Outcome: Progressing  2/11/2023 1133 by Edwin Garibay RN  Outcome: Progressing  Goal: Absence of fever/infection during neutropenic period  Description: INTERVENTIONS:  - Monitor WBC    2/11/2023 1247 by Edwin Garibay RN  Outcome: Adequate for Discharge  2/11/2023 1135 by Edwin Garibay RN  Outcome: Progressing  2/11/2023 1133 by Edwin Garibay RN  Outcome: Progressing     Problem: SAFETY ADULT  Goal: Patient will remain free of falls  Description: INTERVENTIONS:  - Educate patient/family on patient safety including physical limitations  - Instruct patient to call for assistance with activity   - Consult OT/PT to assist with strengthening/mobility   - Keep Call bell within reach  - Keep bed low and locked with side rails adjusted as appropriate  - Keep care items and personal belongings within reach  - Initiate and maintain comfort rounds  - Make Fall Risk Sign visible to staff  - Apply yellow socks and bracelet for high fall risk patients  - Consider moving patient to room near nurses station  2/11/2023 1247 by Rolando Klein RN  Outcome: Adequate for Discharge  2/11/2023 1135 by Rolando Klein RN  Outcome: Progressing  2/11/2023 1133 by Rolando Klein RN  Outcome: Progressing  Goal: Maintain or return to baseline ADL function  Description: INTERVENTIONS:  -  Assess patient's ability to carry out ADLs; assess patient's baseline for ADL function and identify physical deficits which impact ability to perform ADLs (bathing, care of mouth/teeth, toileting, grooming, dressing, etc )  - Assess/evaluate cause of self-care deficits   - Assess range of motion  - Assess patient's mobility; develop plan if impaired  - Assess patient's need for assistive devices and provide as appropriate  - Encourage maximum independence but intervene and supervise when necessary  - Involve family in performance of ADLs  - Assess for home care needs following discharge   - Consider OT consult to assist with ADL evaluation and planning for discharge  - Provide patient education as appropriate  2/11/2023 1247 by Rolando Klein RN  Outcome: Adequate for Discharge  2/11/2023 1135 by Rolando Klein RN  Outcome: Progressing  2/11/2023 1133 by Rolando Klein RN  Outcome: Progressing  Goal: Maintains/Returns to pre admission functional level  Description: INTERVENTIONS:  - Perform BMAT or MOVE assessment daily     - Set and communicate daily mobility goal to care team and patient/family/caregiver  - Collaborate with rehabilitation services on mobility goals if consulted  - Out of bed for toileting  - Record patient progress and toleration of activity level   2/11/2023 1247 by Lakeisha Chavez RN  Outcome: Adequate for Discharge  2/11/2023 1135 by Lakeisha Chavez RN  Outcome: Progressing  2/11/2023 1133 by Lakeisha Chavez RN  Outcome: Progressing     Problem: DISCHARGE PLANNING  Goal: Discharge to home or other facility with appropriate resources  Description: INTERVENTIONS:  - Identify barriers to discharge w/patient and caregiver  - Arrange for needed discharge resources and transportation as appropriate  - Identify discharge learning needs (meds, wound care, etc )  - Arrange for interpretive services to assist at discharge as needed  - Refer to Case Management Department for coordinating discharge planning if the patient needs post-hospital services based on physician/advanced practitioner order or complex needs related to functional status, cognitive ability, or social support system  2/11/2023 1247 by Lakeisha Chavez RN  Outcome: Adequate for Discharge  2/11/2023 1135 by Lakeisha Chavez RN  Outcome: Progressing  2/11/2023 1133 by Lakeisha Chavez RN  Outcome: Progressing     Problem: Knowledge Deficit  Goal: Patient/family/caregiver demonstrates understanding of disease process, treatment plan, medications, and discharge instructions  Description: Complete learning assessment and assess knowledge base    Interventions:  - Provide teaching at level of understanding  - Provide teaching via preferred learning methods  2/11/2023 1247 by Lakeisha Chavez RN  Outcome: Adequate for Discharge  2/11/2023 1135 by Lakeisha Chavez RN  Outcome: Progressing  2/11/2023 1133 by Lakeisha Chavez RN  Outcome: Progressing

## 2023-02-11 NOTE — PROGRESS NOTES
[facility]  Progress Note - Kathy Chin 21 y o  female MRN: [id number]    Unit/Bed#: [facility] -02 Encounter: [id number]    Assessment and Plan: 78-year-old female admitted with acute cholecystitis status post cholecystectomy with positive IOC, now status post ERCP for whom we are following  ERCP on 2/10 showed 2 small stones and sludge in the common bile duct and she underwent successful sphincterotomy and balloon sweep  LFTs remain improved today    Continue diet as tolerated  Okay for discharge home today        Subjective:   She is tolerating her diet, she reports postsurgical abdominal discomfort but no abdominal pain otherwise  Objective:     Vitals: Blood pressure 144/85, pulse 57, temperature 98 2 °F (36 8 °C), resp  rate 20, height 5' 6" (1 676 m), weight 96 4 kg (212 lb 8 4 oz), SpO2 95 %, not currently breastfeeding  ,Body mass index is 34 3 kg/m²        Intake/Output Summary (Last 24 hours) at 2/11/2023 1442  Last data filed at 2/10/2023 1820  Gross per 24 hour   Intake 500 ml   Output 0 ml   Net 500 ml       Physical Exam:   Maria Fernanda Shepherd female lying in bed no acute distress  Nonlabored respirations  Abdomen soft, mildly tender, nondistended    Invasive Devices     None                         Lab, Imaging and other studies:   Admission on 02/08/2023, Discharged on 02/11/2023   Component Date Value   • WBC 02/08/2023 14 81 (H)    • RBC 02/08/2023 4 90    • Hemoglobin 02/08/2023 12 1    • Hematocrit 02/08/2023 39 6    • MCV 02/08/2023 81 (L)    • MCH 02/08/2023 24 7 (L)    • MCHC 02/08/2023 30 6 (L)    • RDW 02/08/2023 14 1    • MPV 02/08/2023 10 7    • Platelets 06/27/2746 345    • nRBC 02/08/2023 0    • Neutrophils Relative 02/08/2023 87 (H)    • Immat GRANS % 02/08/2023 0    • Lymphocytes Relative 02/08/2023 10 (L)    • Monocytes Relative 02/08/2023 3 (L)    • Eosinophils Relative 02/08/2023 0    • Basophils Relative 02/08/2023 0    • Neutrophils Absolute 02/08/2023 12 80 (H)    • Immature Grans Absolute 02/08/2023 0 05    • Lymphocytes Absolute 02/08/2023 1 46    • Monocytes Absolute 02/08/2023 0 45    • Eosinophils Absolute 02/08/2023 0 00    • Basophils Absolute 02/08/2023 0 05    • Protime 02/08/2023 13 5    • INR 02/08/2023 1 03    • PTT 02/08/2023 31    • Sodium 02/08/2023 136    • Potassium 02/08/2023 4 1    • Chloride 02/08/2023 104    • CO2 02/08/2023 22    • ANION GAP 02/08/2023 10    • BUN 02/08/2023 8    • Creatinine 02/08/2023 0 69    • Glucose 02/08/2023 108    • Calcium 02/08/2023 9 8    • AST 02/08/2023 135 (H)    • ALT 02/08/2023 49    • Alkaline Phosphatase 02/08/2023 173 (H)    • Total Protein 02/08/2023 8 0    • Albumin 02/08/2023 4 4    • Total Bilirubin 02/08/2023 0 66    • eGFR 02/08/2023 126    • Magnesium 02/08/2023 2 0    • Lipase 02/08/2023 20    • LACTIC ACID 02/08/2023 1 2    • EXT Preg Test, Ur 02/08/2023 Negative    • Control 02/08/2023 Valid    • Color, UA 02/08/2023 Yellow    • Clarity, UA 02/08/2023 Clear    • pH, UA 02/08/2023 6 5    • Leukocytes, UA 02/08/2023 Negative    • Nitrite, UA 02/08/2023 Negative    • Protein, UA 02/08/2023 Negative    • Glucose, UA 02/08/2023 Negative    • Ketones, UA 02/08/2023 Negative    • Urobilinogen, UA 02/08/2023 0 2    • Bilirubin, UA 02/08/2023 Negative    • Occult Blood, UA 02/08/2023 Negative    • Specific Gravity, UA 02/08/2023 1 025    • SARS-CoV-2 02/09/2023 Negative    • INFLUENZA A PCR 02/09/2023 Negative    • INFLUENZA B PCR 02/09/2023 Negative    • RSV PCR 02/09/2023 Negative    • Sodium 02/09/2023 137    • Potassium 02/09/2023 3 7    • Chloride 02/09/2023 105    • CO2 02/09/2023 26    • ANION GAP 02/09/2023 6    • BUN 02/09/2023 6    • Creatinine 02/09/2023 0 74    • Glucose 02/09/2023 97    • Glucose, Fasting 02/09/2023 97    • Calcium 02/09/2023 9 2    • AST 02/09/2023 163 (H)    • ALT 02/09/2023 68 (H)    • Alkaline Phosphatase 02/09/2023 144 (H)    • Total Protein 02/09/2023 6 9    • Albumin 02/09/2023 3 7    • Total Bilirubin 02/09/2023 0 66    • eGFR 02/09/2023 117    • WBC 02/09/2023 10 61 (H)    • RBC 02/09/2023 4 53    • Hemoglobin 02/09/2023 11 0 (L)    • Hematocrit 02/09/2023 35 8    • MCV 02/09/2023 79 (L)    • MCH 02/09/2023 24 3 (L)    • MCHC 02/09/2023 30 7 (L)    • RDW 02/09/2023 14 2    • MPV 02/09/2023 11 4    • Platelets 56/27/7595 338    • nRBC 02/09/2023 0    • Neutrophils Relative 02/09/2023 67    • Immat GRANS % 02/09/2023 1    • Lymphocytes Relative 02/09/2023 23    • Monocytes Relative 02/09/2023 8    • Eosinophils Relative 02/09/2023 0    • Basophils Relative 02/09/2023 1    • Neutrophils Absolute 02/09/2023 7 26    • Immature Grans Absolute 02/09/2023 0 05    • Lymphocytes Absolute 02/09/2023 2 40    • Monocytes Absolute 02/09/2023 0 81    • Eosinophils Absolute 02/09/2023 0 04    • Basophils Absolute 02/09/2023 0 05    • ABO Grouping 02/09/2023 AB    • Rh Factor 02/09/2023 Positive    • Antibody Screen 02/09/2023 Negative    • Specimen Expiration Date 02/09/2023 [id number]    • WBC 02/10/2023 12 28 (H)    • RBC 02/10/2023 4 09    • Hemoglobin 02/10/2023 9 9 (L)    • Hematocrit 02/10/2023 32 7 (L)    • MCV 02/10/2023 80 (L)    • MCH 02/10/2023 24 2 (L)    • MCHC 02/10/2023 30 3 (L)    • RDW 02/10/2023 14 0    • MPV 02/10/2023 11 3    • Platelets 09/93/3326 269    • nRBC 02/10/2023 0    • Neutrophils Relative 02/10/2023 81 (H)    • Immat GRANS % 02/10/2023 1    • Lymphocytes Relative 02/10/2023 12 (L)    • Monocytes Relative 02/10/2023 6    • Eosinophils Relative 02/10/2023 0    • Basophils Relative 02/10/2023 0    • Neutrophils Absolute 02/10/2023 9 97 (H)    • Immature Grans Absolute 02/10/2023 0 06    • Lymphocytes Absolute 02/10/2023 1 51    • Monocytes Absolute 02/10/2023 0 72    • Eosinophils Absolute 02/10/2023 0 00    • Basophils Absolute 02/10/2023 0 02    • Sodium 02/10/2023 137    • Potassium 02/10/2023 3 9    • Chloride 02/10/2023 104    • CO2 02/10/2023 26    • ANION GAP 02/10/2023 7    • BUN 02/10/2023 6    • Creatinine 02/10/2023 0 73    • Glucose 02/10/2023 90    • Glucose, Fasting 02/10/2023 90    • Calcium 02/10/2023 8 9    • AST 02/10/2023 192 (H)    • ALT 02/10/2023 90 (H)    • Alkaline Phosphatase 02/10/2023 157 (H)    • Total Protein 02/10/2023 6 3 (L)    • Albumin 02/10/2023 3 5    • Total Bilirubin 02/10/2023 0 46    • eGFR 02/10/2023 119    • WBC 02/11/2023 7 36    • RBC 02/11/2023 3 75 (L)    • Hemoglobin 02/11/2023 9 3 (L)    • Hematocrit 02/11/2023 30 7 (L)    • MCV 02/11/2023 82    • MCH 02/11/2023 24 8 (L)    • MCHC 02/11/2023 30 3 (L)    • RDW 02/11/2023 14 1    • Platelets 02/26/9167 211    • MPV 02/11/2023 11 2    • Sodium 02/11/2023 137    • Potassium 02/11/2023 3 6    • Chloride 02/11/2023 104    • CO2 02/11/2023 27    • ANION GAP 02/11/2023 6    • BUN 02/11/2023 5    • Creatinine 02/11/2023 0 66    • Glucose 02/11/2023 72    • Calcium 02/11/2023 8 7    • Corrected Calcium 02/11/2023 9 3    • AST 02/11/2023 57 (H)    • ALT 02/11/2023 49    • Alkaline Phosphatase 02/11/2023 126 (H)    • Total Protein 02/11/2023 6 1 (L)    • Albumin 02/11/2023 3 3 (L)    • Total Bilirubin 02/11/2023 0 32    • eGFR 02/11/2023 128        I have personally reviewed pertinent reports

## 2023-02-11 NOTE — UTILIZATION REVIEW
Initial Clinical Review    Admission: Date/Time/Statement:   Admission Orders (From admission, onward)     Ordered        02/09/23 0227  Place in Observation  Once                      Orders Placed This Encounter   Procedures   • Place in Observation     Standing Status:   Standing     Number of Occurrences:   1     Order Specific Question:   Level of Care     Answer:   Med Surg [16]     ED Arrival Information     Expected   -    Arrival   2/8/2023 19:14    Acuity   Urgent            Means of arrival   Walk-In    Escorted by   Self    Service   Surgery-General    Admission type   Emergency            Arrival complaint   abd pain           Chief Complaint   Patient presents with   • Abdominal Pain     Patient states having a gall bladder attack  States seen last week and wanted to remove, but patient declined  Initial Presentation: 21 y o  female presents to the ED from home with c/o recurrent biliary colic  Has been to ED several times with same complaints w/o acute cholecystitis on imaging and referred for OP f/u  Presents now with worsening, persistent RUQ pain w/ N/V PMH: C section 12/22  She is now requesting cholecystectomy  In the ED lLabs - leukocytosis, elevated LFTs  Imaging - cholelithiasis w/o cholecystitis  On admission exam she has abd distention and abd pain  She was treated with IV Fentanyl, IV Zofran, IV ancef, Oxycodone  She is admitted to OBSERVATION status and then changed to [facility] bed with recurrent biliary colic and early cholecystitis - OR for Lap mandi with intraop cholangiogram, NPO, IV fluids, IV antibiotics       ++++++++++++++  2/9 OPERATIVE NOTE     Preop Diagnosis:  Cholelithiasis [K80 20]     Post-Op Diagnosis Codes:     * Cholelithiasis [K80 20]     * Choledocholithiasis [K80 50]     Procedure(s):  CHOLECYSTECTOMY LAPAROSCOPIC WITH INTRAOPERATIVE CHOLANGIOGRAM    Anesthesia: General     Operative Findings: Operative Findings:  Cholelithiasis    Intraoperative cholangiogram showed dilated common bile duct with a persistent filling defect consistent with a small stone at the sphincter   +++++++++++++++++++++  Post admit check - will get GI Consult for ERCP, Keep NPO p mN, trend LFTs  Date: 2/10:  POD #`1:   Unable to flush out gallstone from CBD  Will have post op ERCP  Some incisional soreness but deeper RUQ resolved  Tolerating clears  2/10 GI Consult - Choledocholithiasis: White count is elevated but she has been afebrile and having postoperative pain but no signs of cholangitis  We will plan for ERCP today  Please keep patient n p o       2/10 ERCP - completed, not resulted  ED Triage Vitals   Temperature Pulse Respirations Blood Pressure SpO2   02/08/23 1919 02/08/23 1919 02/08/23 1919 02/08/23 1919 02/08/23 1919   98 5 °F (36 9 °C) 56 19 130/65 98 %      Temp Source Heart Rate Source Patient Position - Orthostatic VS BP Location FiO2 (%)   02/08/23 1919 02/08/23 1919 02/08/23 1919 02/08/23 1919 --   Oral Monitor Sitting Right arm       Pain Score       02/08/23 2144       6          Wt Readings from Last 1 Encounters:   02/09/23 96 4 kg (212 lb 8 4 oz) (98 %, Z= 2 12)*     * Growth percentiles are based on CDC (Girls, 2-20 Years) data       Additional Vital Signs:   02/10/23 1838 -- 76 16 137/82 -- 98 % -- None (Room air) -- --   02/10/23 1836 -- 97 16 137/82 -- 98 % -- None (Room air) -- --   02/10/23 1825 -- 97 16 146/78 -- 98 % -- None (Room air) -- --   02/10/23 15:11:50 98 2 °F (36 8 °C) 67 16 128/73 91 97 % -- -- -- --   02/10/23 0900 -- -- -- -- -- -- -- None (Room air) -- --   02/10/23 07:10:46 98 1 °F (36 7 °C) 70 15 134/71 92 96 % -- -- -- --   02/09/23 23:35:31 98 1 °F (36 7 °C) 76 18 125/65 85 94 % -- -- -- --   02/09/23 1722 -- 80 20 143/78 102 97 % -- -- -- --   02/09/23 1707 -- 58 26 Abnormal  134/76 100 95 % -- -- -- --   02/09/23 1652 -- 60 20 132/72 97 95 % -- None (Room air) -- --   02/09/23 1637 -- 60 22 134/79 101 100 % -- None (Room air) -- --   02/09/23 1622 98 7 °F (37 1 °C) 89 22 133/76 93 100 % 10 L/min Simple mask WDL --   02/09/23 0900 -- -- -- -- -- -- -- None (Room air) -- --   02/09/23 07:23:53 98 2 °F (36 8 °C) 55 18 125/67 86 95 % -- -- -- --   02/09/23 04:37:09 98 2 °F (36 8 °C) 56 -- 110/49 Abnormal  69 95 % -- None (Room air) -- --   02/09/23 0257 -- 52 Abnormal  -- 129/66 -- 98 % -- None (Room air) -- Lying     Pertinent Labs/Diagnostic Test Results:         US right upper quadrant   Final Result by Sergio Felix MD (02/08 0643)      1  Cholelithiasis without evidence of cholecystitis  2   Interval increase in biliary duct dilatation  Although no proximal choledocholithiasis is seen, further evaluation with MRI with MRCP may be obtained which is more sensitive for distal stones if clinically warranted     FL ERCP biliary and pancreatic    (Results Pending)        Results from last 7 days   Lab Units 02/09/23  0305   SARS-COV-2  Negative     Results from last 7 days   Lab Units 02/10/23  0514 02/09/23  0516 02/08/23  2142 02/03/23  2345   WBC Thousand/uL 12 28* 10 61* 14 81* 16 78*   HEMOGLOBIN g/dL 9 9* 11 0* 12 1 11 9   HEMATOCRIT % 32 7* 35 8 39 6 39 5   PLATELETS Thousands/uL 269 338 345 372   NEUTROS ABS Thousands/µL 9 97* 7 26 12 80* 13 64*         Results from last 7 days   Lab Units 02/10/23  0514 02/09/23  0516 02/08/23 2142 02/03/23  2345   SODIUM mmol/L 137 137 136 136   POTASSIUM mmol/L 3 9 3 7 4 1 3 3*   CHLORIDE mmol/L 104 105 104 100   CO2 mmol/L 26 26 22 25   ANION GAP mmol/L 7 6 10 11   BUN mg/dL 6 6 8 7   CREATININE mg/dL 0 73 0 74 0 69 0 73   EGFR ml/min/1 73sq m 119 117 126 119   CALCIUM mg/dL 8 9 9 2 9 8 10 7*   MAGNESIUM mg/dL  --   --  2 0  --      Results from last 7 days   Lab Units 02/10/23  0514 02/09/23  0516 02/08/23  2142 02/03/23  0088   AST U/L 192* 163* 135* 34   ALT U/L 90* 68* 49 14   ALK PHOS U/L 157* 144* 173* 100   TOTAL PROTEIN g/dL 6 3* 6 9 8 0 8 5*   ALBUMIN g/dL 3 5 3 7 4 4 4 5 TOTAL BILIRUBIN mg/dL 0 46 0 66 0 66 0 46         Results from last 7 days   Lab Units 02/10/23  0514 02/09/23  0516 02/08/23 2142 02/03/23  2345   GLUCOSE RANDOM mg/dL 90 97 108 108     Results from last 7 days   Lab Units 02/08/23 2142 02/03/23  2345   PROTIME seconds 13 5 12 4   INR  1 03 0 93   PTT seconds 31 21*             Results from last 7 days   Lab Units 02/08/23  2142   LACTIC ACID mmol/L 1 2                         Results from last 7 days   Lab Units 02/08/23  2142 02/03/23  2345   LIPASE u/L 20 13                 Results from last 7 days   Lab Units 02/08/23  2345   CLARITY UA  Clear   COLOR UA  Yellow   SPEC GRAV UA  1 025   PH UA  6 5   GLUCOSE UA mg/dl Negative   KETONES UA mg/dl Negative   BLOOD UA  Negative   PROTEIN UA mg/dl Negative   NITRITE UA  Negative   BILIRUBIN UA  Negative   UROBILINOGEN UA E U /dl 0 2   LEUKOCYTES UA  Negative     Results from last 7 days   Lab Units 02/09/23  0305   INFLUENZA A PCR  Negative   INFLUENZA B PCR  Negative   RSV PCR  Negative     ED Treatment:   Medication Administration from 02/08/2023 1914 to 02/09/2023 0422       Date/Time Order Dose Route Action     02/08/2023 2144 EST fentanyl citrate (PF) 100 MCG/2ML 50 mcg 50 mcg Intravenous Given     02/08/2023 2142 EST ondansetron (ZOFRAN) injection 4 mg 4 mg Intravenous Given     02/09/2023 0340 EST ceFAZolin (ANCEF) IVPB (premix in dextrose) 2,000 mg 50 mL 2,000 mg Intravenous New Bag     02/09/2023 0304 EST oxyCODONE (ROXICODONE) IR tablet 5 mg 5 mg Oral Given        Past Medical History:   Diagnosis Date   • Obesity (BMI 35 0-39 9 without comorbidity) 12/9/2022     Present on Admission:  • Recurrent biliary colic      Admitting Diagnosis: Cholelithiasis [B27 55]  Biliary colic [U70 69]  Abdominal pain [R10 9]  Age/Sex: 21 y o  female  Admission Orders:  Scheduled Medications:  enoxaparin, 40 mg, Subcutaneous, Daily  pantoprazole, 40 mg, Intravenous, Q24H [address]      Continuous IV Infusions:  lactated ringers, 125 mL/hr, Intravenous, Continuous      PRN Meds:  HYDROmorphone, 0 5 mg, Intravenous, Q4H PRN  ondansetron, 4 mg, Intravenous, Q6H PRN  oxyCODONE, 10 mg, Oral, Q4H PRN - x 2 2/9, x 1 2/10  oxyCODONE, 5 mg, Oral, Q4H PRN - x 1 2/9, 2/10    scds  Up as abhijit   ERCP  Diet clears  IP CONSULT TO GASTROENTEROLOGY    Network Utilization Review Department  ATTENTION: Please call with any questions or concerns to [phone] and carefully listen to the prompts so that you are directed to the right person  All voicemails are confidential   Javier Sapp all requests for admission clinical reviews, approved or denied determinations and any other requests to dedicated fax number below belonging to the campus where the patient is receiving treatment   List of dedicated fax numbers for the Facilities:  [address] DENIALS (Administrative/Medical Necessity) [phone]   [address] (Maternity/NICU/Pediatrics) [phone]   9 Jessica Ji [phone]   [facility] 77 [phone]   [address] [place] [address] Raul Kong [place] 28 [phone]   155 [facility] Renu Koehler Pending sale to [facility] 134 815 [facility] [phone]

## 2023-02-13 ENCOUNTER — TELEPHONE (OUTPATIENT)
Dept: SURGERY | Facility: CLINIC | Age: 18
End: 2023-02-13

## 2023-02-13 NOTE — TELEPHONE ENCOUNTER
Post op call  Left message for patient  Asked how patient was doing  Also told patient to call to set up post op appointment

## 2023-06-10 ENCOUNTER — OFFICE VISIT (OUTPATIENT)
Dept: URGENT CARE | Facility: MEDICAL CENTER | Age: 18
End: 2023-06-10
Payer: COMMERCIAL

## 2023-06-10 VITALS
OXYGEN SATURATION: 98 % | HEART RATE: 93 BPM | HEIGHT: 66 IN | RESPIRATION RATE: 18 BRPM | TEMPERATURE: 97.7 F | SYSTOLIC BLOOD PRESSURE: 122 MMHG | DIASTOLIC BLOOD PRESSURE: 60 MMHG | WEIGHT: 222 LBS | BODY MASS INDEX: 35.68 KG/M2

## 2023-06-10 DIAGNOSIS — J02.9 SORE THROAT: ICD-10-CM

## 2023-06-10 DIAGNOSIS — J02.9 ACUTE PHARYNGITIS, UNSPECIFIED ETIOLOGY: Primary | ICD-10-CM

## 2023-06-10 LAB — S PYO AG THROAT QL: NEGATIVE

## 2023-06-10 PROCEDURE — 87880 STREP A ASSAY W/OPTIC: CPT | Performed by: PHYSICIAN ASSISTANT

## 2023-06-10 PROCEDURE — 87070 CULTURE OTHR SPECIMN AEROBIC: CPT | Performed by: PHYSICIAN ASSISTANT

## 2023-06-10 PROCEDURE — 99212 OFFICE O/P EST SF 10 MIN: CPT | Performed by: PHYSICIAN ASSISTANT

## 2023-06-10 NOTE — PROGRESS NOTES
[employer] [facility]        NAME: Karsten Kumar is a 21 y o  female  : 2005    MRN: [id number]  DATE: Steph 10, 2023  TIME: 6:59 PM    Assessment and Plan   Acute pharyngitis, unspecified etiology [J02 9]  1  Acute pharyngitis, unspecified etiology        2  Sore throat  POCT rapid strepA    Throat culture            Patient Instructions       Follow up with PCP in 3-5 days  Proceed to  ER if symptoms worsen  Chief Complaint     Chief Complaint   Patient presents with   • Sore Throat     Sore throat that started on Thursday          History of Present Illness       Presents with a 3-day history of a sore throat  This morning she noticed white spots on her left tonsil  No fever or chills runny stuffy nose ear pain or cough  Review of Systems   Review of Systems   Constitutional: Negative for chills and fever  HENT: Positive for sore throat  Negative for congestion, ear pain and rhinorrhea  Respiratory: Negative for cough            Current Medications       Current Outpatient Medications:   •  docusate sodium (COLACE) 50 mg capsule, Take 1 capsule (50 mg total) by mouth 2 (two) times a day (Patient not taking: Reported on 2023), Disp: , Rfl: 0  •  naproxen (Naprosyn) 500 mg tablet, Take 1 tablet (500 mg total) by mouth 2 (two) times a day with meals (Patient not taking: Reported on 2023), Disp: 30 tablet, Rfl: 0  •  ondansetron (ZOFRAN-ODT) 4 mg disintegrating tablet, Take 1 tablet (4 mg total) by mouth every 6 (six) hours as needed for nausea or vomiting (Patient not taking: Reported on 6/10/2023), Disp: 20 tablet, Rfl: 0  •  pantoprazole (PROTONIX) 40 mg tablet, Take 1 tablet (40 mg total) by mouth daily (Patient not taking: Reported on 6/10/2023), Disp: 21 tablet, Rfl: 0    Current Allergies     Allergies as of 06/10/2023   • (No Known Allergies)            The following portions of the patient's history were reviewed and updated as appropriate: allergies, current medications, past "family history, past medical history, past social history, past surgical history and problem list      Past Medical History:   Diagnosis Date   • Obesity (BMI 35 0-39 9 without comorbidity) 2022       Past Surgical History:   Procedure Laterality Date   • CHOLECYSTECTOMY LAPAROSCOPIC N/A 2023    Procedure: CHOLECYSTECTOMY LAPAROSCOPIC WITH INTRAOPERATIVE CHOLANGIOGRAM;  Surgeon: Renata Case MD;  Location[facility] OR;  Service: General   • MYRINGOTOMY W/ TUBES     • HI  DELIVERY ONLY N/A 12/10/2022    Procedure:  SECTION (); Surgeon: Yvonne Fernandez DO;  Location: [facility];  Service: Obstetrics       Family History   Problem Relation Age of Onset   • Hyperlipidemia Mother    • No Known Problems Father          Medications have been verified  Objective   /60   Pulse 93   Temp 97 7 °F (36 5 °C)   Resp 18   Ht 5' 6\" (1 676 m)   Wt 101 kg (222 lb)   LMP 06/10/2023   SpO2 98%   BMI 35 83 kg/m²   Patient's last menstrual period was 06/10/2023  Physical Exam     Physical Exam  Vitals and nursing note reviewed  Constitutional:       Appearance: She is well-developed  HENT:      Head: Normocephalic and atraumatic  Right Ear: Tympanic membrane normal       Left Ear: Tympanic membrane normal       Mouth/Throat:      Mouth: Mucous membranes are moist       Pharynx: Uvula midline  Posterior oropharyngeal erythema present  No uvula swelling  Tonsils: Tonsillar exudate present  Cardiovascular:      Rate and Rhythm: Normal rate and regular rhythm  Pulmonary:      Effort: Pulmonary effort is normal       Breath sounds: Normal breath sounds  Musculoskeletal:      Cervical back: Neck supple  Lymphadenopathy:      Cervical: No cervical adenopathy  Skin:     General: Skin is warm  Neurological:      Mental Status: She is alert                     "

## 2023-06-13 LAB — BACTERIA THROAT CULT: NORMAL

## 2023-12-17 ENCOUNTER — OFFICE VISIT (OUTPATIENT)
Dept: URGENT CARE | Facility: MEDICAL CENTER | Age: 18
End: 2023-12-17
Payer: COMMERCIAL

## 2023-12-17 VITALS
HEIGHT: 66 IN | OXYGEN SATURATION: 97 % | WEIGHT: 228 LBS | BODY MASS INDEX: 36.64 KG/M2 | HEART RATE: 82 BPM | RESPIRATION RATE: 18 BRPM | TEMPERATURE: 98.9 F | SYSTOLIC BLOOD PRESSURE: 116 MMHG | DIASTOLIC BLOOD PRESSURE: 70 MMHG

## 2023-12-17 DIAGNOSIS — R39.9 UTI SYMPTOMS: Primary | ICD-10-CM

## 2023-12-17 LAB
SL AMB  POCT GLUCOSE, UA: NORMAL
SL AMB LEUKOCYTE ESTERASE,UA: NORMAL
SL AMB POCT BILIRUBIN,UA: NORMAL
SL AMB POCT BLOOD,UA: NORMAL
SL AMB POCT CLARITY,UA: CLEAR
SL AMB POCT COLOR,UA: NORMAL
SL AMB POCT KETONES,UA: NORMAL
SL AMB POCT NITRITE,UA: NORMAL
SL AMB POCT PH,UA: NORMAL
SL AMB POCT SPECIFIC GRAVITY,UA: NORMAL
SL AMB POCT URINE PROTEIN: NORMAL
SL AMB POCT UROBILINOGEN: NORMAL

## 2023-12-17 PROCEDURE — 81002 URINALYSIS NONAUTO W/O SCOPE: CPT | Performed by: STUDENT IN AN ORGANIZED HEALTH CARE EDUCATION/TRAINING PROGRAM

## 2023-12-17 PROCEDURE — 87186 SC STD MICRODIL/AGAR DIL: CPT | Performed by: STUDENT IN AN ORGANIZED HEALTH CARE EDUCATION/TRAINING PROGRAM

## 2023-12-17 PROCEDURE — 87086 URINE CULTURE/COLONY COUNT: CPT | Performed by: STUDENT IN AN ORGANIZED HEALTH CARE EDUCATION/TRAINING PROGRAM

## 2023-12-17 PROCEDURE — 87077 CULTURE AEROBIC IDENTIFY: CPT | Performed by: STUDENT IN AN ORGANIZED HEALTH CARE EDUCATION/TRAINING PROGRAM

## 2023-12-17 PROCEDURE — 99213 OFFICE O/P EST LOW 20 MIN: CPT | Performed by: STUDENT IN AN ORGANIZED HEALTH CARE EDUCATION/TRAINING PROGRAM

## 2023-12-17 RX ORDER — CEPHALEXIN 500 MG/1
500 CAPSULE ORAL EVERY 12 HOURS SCHEDULED
Qty: 14 CAPSULE | Refills: 0 | OUTPATIENT
Start: 2023-12-17 | End: 2023-12-18

## 2023-12-17 NOTE — PROGRESS NOTES
[facility]        NAME: Yulissa Chin is a 18 y.o. female  : 2005    MRN: [id number]    Assessment and Plan   UTI symptoms [R39.9]  1. UTI symptoms  POCT urine dip    cephalexin (KEFLEX) 500 mg capsule          Results for orders placed or performed in visit on 23   POCT urine dip   Result Value Ref Range    LEUKOCYTE ESTERASE,UA azo     NITRITE,UA azo     SL AMB POCT UROBILINOGEN azo     POCT URINE PROTEIN azo      PH,UA azo     BLOOD,UA azo     SPECIFIC GRAVITY,UA azo     KETONES,UA azo     BILIRUBIN,UA azo     GLUCOSE, UA azo      COLOR,UA orange     CLARITY,UA clear      Patient has been using Azo at home which is going to skew UA results so did not perform in office and will send straight for urine culture.  Patient would prefer to start antibiotics while awaiting urine culture results so we will start Keflex empirically and adjust as needed pending culture results.  Provided warning signs of pyelonephritis.    Patient Instructions     See wrap up for details  Follow up with PCP in 3-5 days.  Proceed to  ER if symptoms worsen.    Chief Complaint     Chief Complaint   Patient presents with    Possible UTI     Painful urination, burning upon urination that started this morning          History of Present Illness       HPI    P/w onset of symptoms yesterday  Reports dysuria, frequency  Denies suprapubic pain, fever, chills, nausea, vomiting, back pain, incontinence  Took azo with mild improvement  Currently on menses  No hx of recurrent UTIs    Review of Systems   Review of Systems   Constitutional:  Negative for chills and fever.   HENT:  Negative for ear pain and sore throat.    Eyes:  Negative for pain and visual disturbance.   Respiratory:  Negative for cough, chest tightness and shortness of breath.    Cardiovascular:  Negative for chest pain and palpitations.   Gastrointestinal:  Negative for abdominal pain, constipation, diarrhea, nausea and vomiting.   Genitourinary:  Positive  for dysuria, frequency and hematuria (on menses). Negative for flank pain, menstrual problem and pelvic pain.   Musculoskeletal:  Negative for arthralgias and back pain.   Skin:  Negative for color change and rash.   Neurological:  Negative for seizures and syncope.   Psychiatric/Behavioral:  Negative for dysphoric mood and suicidal ideas.    All other systems reviewed and are negative.      Current Medications       Current Outpatient Medications:     cephalexin (KEFLEX) 500 mg capsule, Take 1 capsule (500 mg total) by mouth every 12 (twelve) hours for 7 days, Disp: 14 capsule, Rfl: 0    docusate sodium (COLACE) 50 mg capsule, Take 1 capsule (50 mg total) by mouth 2 (two) times a day (Patient not taking: Reported on 2/9/2023), Disp: , Rfl: 0    naproxen (Naprosyn) 500 mg tablet, Take 1 tablet (500 mg total) by mouth 2 (two) times a day with meals (Patient not taking: Reported on 2/9/2023), Disp: 30 tablet, Rfl: 0    ondansetron (ZOFRAN-ODT) 4 mg disintegrating tablet, Take 1 tablet (4 mg total) by mouth every 6 (six) hours as needed for nausea or vomiting (Patient not taking: Reported on 6/10/2023), Disp: 20 tablet, Rfl: 0    pantoprazole (PROTONIX) 40 mg tablet, Take 1 tablet (40 mg total) by mouth daily (Patient not taking: Reported on 6/10/2023), Disp: 21 tablet, Rfl: 0    Current Allergies     Allergies as of 12/17/2023    (No Known Allergies)            The following portions of the patient's history were reviewed and updated as appropriate: allergies, current medications, past family history, past medical history, past social history, past surgical history and problem list.     Past Medical History:   Diagnosis Date    Obesity (BMI 35.0-39.9 without comorbidity) 12/9/2022       Past Surgical History:   Procedure Laterality Date    CHOLECYSTECTOMY LAPAROSCOPIC N/A 2/9/2023    Procedure: CHOLECYSTECTOMY LAPAROSCOPIC WITH INTRAOPERATIVE CHOLANGIOGRAM;  Surgeon: Onel Gloria MD;  Location: [facility];   "Service: General    MYRINGOTOMY W/ TUBES      MD  DELIVERY ONLY N/A 12/10/2022    Procedure:  SECTION ();  Surgeon: Radha Andres DO;  Location: [facility];  Service: Obstetrics       Family History   Problem Relation Age of Onset    Hyperlipidemia Mother     No Known Problems Father          Medications have been verified.        Objective   /70   Pulse 82   Temp 98.9 °F (37.2 °C)   Resp 18   Ht 5' 6\" (1.676 m)   Wt 103 kg (228 lb)   LMP 2023   SpO2 97%   Breastfeeding No   BMI 36.80 kg/m²        Physical Exam     Physical Exam  Constitutional:       General: She is not in acute distress.     Appearance: Normal appearance.   HENT:      Head: Normocephalic and atraumatic.   Eyes:      Extraocular Movements: Extraocular movements intact.      Conjunctiva/sclera: Conjunctivae normal.   Cardiovascular:      Rate and Rhythm: Normal rate.   Pulmonary:      Effort: Pulmonary effort is normal. No respiratory distress.   Abdominal:      General: There is no distension.      Tenderness: There is no abdominal tenderness. There is no right CVA tenderness, left CVA tenderness, guarding or rebound.   Skin:     General: Skin is warm and dry.   Neurological:      General: No focal deficit present.      Mental Status: She is alert and oriented to person, place, and time.   Psychiatric:         Mood and Affect: Mood normal.         Behavior: Behavior normal.                 "

## 2023-12-18 ENCOUNTER — HOSPITAL ENCOUNTER (EMERGENCY)
Facility: HOSPITAL | Age: 18
Discharge: HOME | End: 2023-12-18
Attending: EMERGENCY MEDICINE | Admitting: EMERGENCY MEDICINE
Payer: COMMERCIAL

## 2023-12-18 VITALS
SYSTOLIC BLOOD PRESSURE: 135 MMHG | DIASTOLIC BLOOD PRESSURE: 77 MMHG | HEART RATE: 97 BPM | TEMPERATURE: 97 F | RESPIRATION RATE: 18 BRPM | OXYGEN SATURATION: 97 %

## 2023-12-18 DIAGNOSIS — N39.0 UTI (URINARY TRACT INFECTION): Primary | ICD-10-CM

## 2023-12-18 LAB
BACTERIA UR QL AUTO: ABNORMAL /HPF
BILIRUB UR QL STRIP: ABNORMAL
CLARITY UR: ABNORMAL
COLOR UR: ABNORMAL
EXT PREGNANCY TEST URINE: NEGATIVE
EXT. CONTROL: NORMAL
GLUCOSE UR STRIP-MCNC: ABNORMAL MG/DL
HGB UR QL STRIP.AUTO: ABNORMAL
KETONES UR STRIP-MCNC: ABNORMAL MG/DL
LEUKOCYTE ESTERASE UR QL STRIP: ABNORMAL
NITRITE UR QL STRIP: POSITIVE
NON-SQ EPI CELLS URNS QL MICRO: ABNORMAL /HPF
PH UR STRIP.AUTO: 5 [PH]
PROT UR STRIP-MCNC: >=300 MG/DL
RBC #/AREA URNS AUTO: ABNORMAL /HPF
SP GR UR STRIP.AUTO: 1.01 (ref 1–1.03)
UROBILINOGEN UR QL STRIP.AUTO: >=8 E.U./DL
WBC #/AREA URNS AUTO: ABNORMAL /HPF
WBC CLUMPS # UR AUTO: PRESENT /UL

## 2023-12-18 PROCEDURE — 99284 EMERGENCY DEPT VISIT MOD MDM: CPT | Performed by: EMERGENCY MEDICINE

## 2023-12-18 PROCEDURE — 87186 SC STD MICRODIL/AGAR DIL: CPT | Performed by: EMERGENCY MEDICINE

## 2023-12-18 PROCEDURE — 81001 URINALYSIS AUTO W/SCOPE: CPT | Performed by: EMERGENCY MEDICINE

## 2023-12-18 PROCEDURE — 87077 CULTURE AEROBIC IDENTIFY: CPT | Performed by: EMERGENCY MEDICINE

## 2023-12-18 PROCEDURE — 87086 URINE CULTURE/COLONY COUNT: CPT | Performed by: EMERGENCY MEDICINE

## 2023-12-18 PROCEDURE — 81025 URINE PREGNANCY TEST: CPT | Performed by: EMERGENCY MEDICINE

## 2023-12-18 PROCEDURE — 99283 EMERGENCY DEPT VISIT LOW MDM: CPT

## 2023-12-18 RX ORDER — CEPHALEXIN 500 MG/1
500 CAPSULE ORAL EVERY 6 HOURS SCHEDULED
Qty: 40 CAPSULE | Refills: 0 | Status: SHIPPED | OUTPATIENT
Start: 2023-12-18 | End: 2023-12-28

## 2023-12-18 RX ORDER — CEPHALEXIN 250 MG/1
500 CAPSULE ORAL ONCE
Status: COMPLETED | OUTPATIENT
Start: 2023-12-18 | End: 2023-12-18

## 2023-12-18 RX ADMIN — CEPHALEXIN 500 MG: 250 CAPSULE ORAL at 02:37

## 2023-12-19 LAB — BACTERIA UR CULT: ABNORMAL

## 2023-12-19 NOTE — ED PROVIDER NOTES
History  Chief Complaint   Patient presents with    Possible UTI     Seen at urgent care this morning. S/s became worse.     18-year-old female who presents for concern for UTI.  She was just seen at urgent care yesterday morning, where she was diagnosed with a UTI and started on Keflex.  She states that she has not been able to  the Keflex at this time.  She has been using Pyridium with some relief initially however less so now.  She describes significant frequency, dysuria.  States that she occasionally gets some pain to the bilateral back although it is not constant.  She denies any fevers or chills.  No nausea or vomiting.  No suprapubic pain.  ROS otherwise negative        Prior to Admission Medications   Prescriptions Last Dose Informant Patient Reported? Taking?   cephalexin (KEFLEX) 500 mg capsule   No No   Sig: Take 1 capsule (500 mg total) by mouth every 12 (twelve) hours for 7 days   docusate sodium (COLACE) 50 mg capsule   No No   Sig: Take 1 capsule (50 mg total) by mouth 2 (two) times a day   Patient not taking: Reported on 2/9/2023   naproxen (Naprosyn) 500 mg tablet   No No   Sig: Take 1 tablet (500 mg total) by mouth 2 (two) times a day with meals   Patient not taking: Reported on 2/9/2023   ondansetron (ZOFRAN-ODT) 4 mg disintegrating tablet   No No   Sig: Take 1 tablet (4 mg total) by mouth every 6 (six) hours as needed for nausea or vomiting   Patient not taking: Reported on 6/10/2023   pantoprazole (PROTONIX) 40 mg tablet   No No   Sig: Take 1 tablet (40 mg total) by mouth daily   Patient not taking: Reported on 6/10/2023      Facility-Administered Medications: None       Past Medical History:   Diagnosis Date    Obesity (BMI 35.0-39.9 without comorbidity) 12/9/2022       Past Surgical History:   Procedure Laterality Date    CHOLECYSTECTOMY LAPAROSCOPIC N/A 2/9/2023    Procedure: CHOLECYSTECTOMY LAPAROSCOPIC WITH INTRAOPERATIVE CHOLANGIOGRAM;  Surgeon: Onel Gloria MD;  Location: [facility];  Service: General    MYRINGOTOMY W/ TUBES      AR  DELIVERY ONLY N/A 12/10/2022    Procedure:  SECTION ();  Surgeon: Radha Andres DO;  Location: [facility];  Service: Obstetrics       Family History   Problem Relation Age of Onset    Hyperlipidemia Mother     No Known Problems Father      I have reviewed and agree with the history as documented.    E-Cigarette/Vaping    E-Cigarette Use Former User      E-Cigarette/Vaping Substances    Nicotine Yes      Social History     Tobacco Use    Smoking status: Never    Smokeless tobacco: Never   Vaping Use    Vaping status: Former    Substances: Nicotine   Substance Use Topics    Alcohol use: Never    Drug use: Never       Review of Systems   Constitutional:  Negative for chills and fever.   HENT:  Negative for congestion, rhinorrhea and sore throat.    Respiratory:  Negative for cough and shortness of breath.    Cardiovascular:  Negative for chest pain and palpitations.   Gastrointestinal:  Negative for abdominal pain, constipation, diarrhea, nausea and vomiting.   Genitourinary:  Positive for dysuria and frequency. Negative for difficulty urinating and flank pain.   Musculoskeletal:  Negative for arthralgias.   Neurological:  Negative for dizziness, weakness, light-headedness and headaches.   Psychiatric/Behavioral:  Negative for agitation, behavioral problems and confusion.    All other systems reviewed and are negative.      Physical Exam  Physical Exam  Constitutional:       Appearance: She is well-developed.   HENT:      Head: Normocephalic and atraumatic.   Cardiovascular:      Rate and Rhythm: Normal rate and regular rhythm.      Heart sounds: Normal heart sounds. No murmur heard.  Pulmonary:      Effort: Pulmonary effort is normal. No respiratory distress.      Breath sounds: Normal breath sounds.   Abdominal:      General: Bowel sounds are normal. There is no distension.      Palpations: Abdomen is soft.      Tenderness: There is  no abdominal tenderness. There is no right CVA tenderness or left CVA tenderness.      Comments: No abdominal tenderness, no CVA tenderness.   Musculoskeletal:         General: No deformity.   Skin:     General: Skin is warm.      Findings: No rash.   Neurological:      Mental Status: She is alert and oriented to person, place, and time.   Psychiatric:         Behavior: Behavior normal.         Thought Content: Thought content normal.         Judgment: Judgment normal.         Vital Signs  ED Triage Vitals [12/18/23 0138]   Temperature Pulse Respirations Blood Pressure SpO2   (!) 97 °F (36.1 °C) 97 18 135/77 97 %      Temp Source Heart Rate Source Patient Position - Orthostatic VS BP Location FiO2 (%)   Temporal -- -- -- --      Pain Score       --           Vitals:    12/18/23 0138   BP: 135/77   Pulse: 97         Visual Acuity      ED Medications  Medications   cephalexin (KEFLEX) capsule 500 mg (500 mg Oral Given 12/18/23 0237)       Diagnostic Studies  Results Reviewed       Procedure Component Value Units Date/Time    POCT pregnancy, urine [[id number]]  (Normal) Resulted: 12/18/23 0232    Lab Status: Final result Updated: 12/18/23 0232     EXT Preg Test, Ur Negative     Control Valid    Urine Microscopic [[id number]]  (Abnormal) Collected: 12/18/23 0145    Lab Status: Final result Specimen: Urine, Clean Catch Updated: 12/18/23 0206     RBC, UA 4-10 /hpf      WBC, UA 20-30 /hpf      Epithelial Cells Occasional /hpf      Bacteria, UA Moderate /hpf      WBC Clumps Present    Urine culture [[id number]] Collected: 12/18/23 0145    Lab Status: In process Specimen: Urine, Clean Catch Updated: 12/18/23 0205    UA w Reflex to Microscopic w Reflex to Culture [[id number]]  (Abnormal) Collected: 12/18/23 0145    Lab Status: Final result Specimen: Urine, Clean Catch Updated: 12/18/23 0201     Color, UA Red     Clarity, UA Slightly Cloudy     Specific Gravity, UA 1.015     pH, UA 5.0     Leukocytes, UA Moderate     Nitrite, UA  Positive     Protein, UA >=300 mg/dl      Glucose,  (1/4%) mg/dl      Ketones, UA Trace mg/dl      Urobilinogen, UA >=8.0 E.U./dl      Bilirubin, UA Moderate     Occult Blood, UA Moderate                   No orders to display              Procedures  Procedures         ED Course  ED Course as of 12/19/23 0524   Mon Dec 18, 2023   0219 WBC, UA(!): 20-30   0219 Bacteria, UA(!): Moderate   0219 RBC, UA(!): 4-10                                             Medical Decision Making  I reviewed the patient's medical chart, PMHx, prior encounters, medications.  I reviewed patient's outpatient lab which was difficult to interpret in the setting of Azo use    My DDx includes: UTI, cystitis, Early pyelonephritis    Patient has no symptoms consistent with systemic infection, she does report occasional back pain however nonconstant.  She is largely well-appearing.  This may be early pyelonephritis versus cystitis.  Urinalysis here confirms UTI.  Will change patient's prescription to Keflex 4 times daily for 10 days to cover for potential early pyelonephritis, will give her dose here.  Will discharge with strict return precautions.    Amount and/or Complexity of Data Reviewed  Labs: ordered. Decision-making details documented in ED Course.    Risk  Prescription drug management.             Disposition  Final diagnoses:   UTI (urinary tract infection)     Time reflects when diagnosis was documented in both MDM as applicable and the Disposition within this note       Time User Action Codes Description Comment    12/18/2023  2:19 AM Andrei Cristina Add [N39.0] UTI (urinary tract infection)           ED Disposition       ED Disposition   Discharge    Condition   Stable    Date/Time   Mon Dec 18, 2023  2:19 AM    Comment   Yulissa Chin discharge to home/self care.                   Follow-up Information       Follow up With Specialties Details Why Contact Info    Mirtha Claros DO Family Medicine Call  For  re-evaluation [address] [place] [phone]  [phone]              Discharge Medication List as of 12/18/2023  2:30 AM        CONTINUE these medications which have CHANGED    Details   cephalexin (KEFLEX) 500 mg capsule Take 1 capsule (500 mg total) by mouth every 6 (six) hours for 10 days, Starting Mon 12/18/2023, Until Thu 12/28/2023, Normal           CONTINUE these medications which have NOT CHANGED    Details   docusate sodium (COLACE) 50 mg capsule Take 1 capsule (50 mg total) by mouth 2 (two) times a day, Starting Wed 12/14/2022, No Print      naproxen (Naprosyn) 500 mg tablet Take 1 tablet (500 mg total) by mouth 2 (two) times a day with meals, Starting Sun 1/1/2023, Normal      ondansetron (ZOFRAN-ODT) 4 mg disintegrating tablet Take 1 tablet (4 mg total) by mouth every 6 (six) hours as needed for nausea or vomiting, Starting Sun 1/1/2023, Normal      pantoprazole (PROTONIX) 40 mg tablet Take 1 tablet (40 mg total) by mouth daily, Starting Sun 1/1/2023, Normal             No discharge procedures on file.    PDMP Review         Value Time User    PDMP Reviewed  Yes 2/9/2023  2:24 PM Claudia Duval PA-C            ED Provider  Electronically Signed by             Andrei Cristina MD  12/19/23 6574

## 2023-12-20 LAB — BACTERIA UR CULT: ABNORMAL

## 2025-01-03 ENCOUNTER — OFFICE VISIT (OUTPATIENT)
Dept: URGENT CARE | Facility: MEDICAL CENTER | Age: 20
End: 2025-01-03
Payer: COMMERCIAL

## 2025-01-03 VITALS
WEIGHT: 231 LBS | TEMPERATURE: 98.6 F | RESPIRATION RATE: 18 BRPM | SYSTOLIC BLOOD PRESSURE: 122 MMHG | OXYGEN SATURATION: 99 % | HEIGHT: 66 IN | DIASTOLIC BLOOD PRESSURE: 70 MMHG | BODY MASS INDEX: 37.12 KG/M2 | HEART RATE: 104 BPM

## 2025-01-03 DIAGNOSIS — R39.9 UTI SYMPTOMS: Primary | ICD-10-CM

## 2025-01-03 LAB
SL AMB  POCT GLUCOSE, UA: NORMAL
SL AMB LEUKOCYTE ESTERASE,UA: NORMAL
SL AMB POCT BILIRUBIN,UA: NORMAL
SL AMB POCT BLOOD,UA: NORMAL
SL AMB POCT CLARITY,UA: CLEAR
SL AMB POCT COLOR,UA: YELLOW
SL AMB POCT KETONES,UA: NORMAL
SL AMB POCT NITRITE,UA: NORMAL
SL AMB POCT PH,UA: 6.5
SL AMB POCT SPECIFIC GRAVITY,UA: 1.01
SL AMB POCT URINE PROTEIN: NORMAL
SL AMB POCT UROBILINOGEN: 0.2

## 2025-01-03 PROCEDURE — 99213 OFFICE O/P EST LOW 20 MIN: CPT | Performed by: PHYSICIAN ASSISTANT

## 2025-01-03 PROCEDURE — 87086 URINE CULTURE/COLONY COUNT: CPT | Performed by: PHYSICIAN ASSISTANT

## 2025-01-03 PROCEDURE — 81002 URINALYSIS NONAUTO W/O SCOPE: CPT | Performed by: PHYSICIAN ASSISTANT

## 2025-01-03 NOTE — PROGRESS NOTES
[facility]        NAME: Yulissa Chin is a 19 y.o. female  : 2005    MRN: [id number]  DATE: January 3, 2025  TIME: 3:05 PM    Assessment and Plan   UTI symptoms [R39.9]  1. UTI symptoms  POCT urine dip    Urine culture        UA dip negative except for blood. Will treat if culture is positive.Patient is breast feeding.    Patient Instructions     Drink plenty of fluids  If symptoms fail to improve follow up with PCP    Follow up with PCP in 3-5 days.  Proceed to  ER if symptoms worsen.    If tests have been performed at [facility], our office will contact you with results if changes need to be made to the care plan discussed with you at the visit.  You can review your full results on [facility].    Chief Complaint     Chief Complaint   Patient presents with   • Possible UTI     Frequent and painful urination 2 days .           History of Present Illness       Patient presents with a 2-days history of  urinary frequency and painful urination. Had  2 weeks ago and is breast feeding. Patient denies fever, abd pain or back pain.         Review of Systems   Review of Systems   Constitutional:  Negative for chills and fever.   Gastrointestinal:  Negative for abdominal pain.   Genitourinary:  Positive for dysuria, frequency and vaginal bleeding. Negative for hematuria.   Musculoskeletal:  Negative for back pain.         Current Medications       Current Outpatient Medications:   •  docusate sodium (COLACE) 50 mg capsule, Take 1 capsule (50 mg total) by mouth 2 (two) times a day (Patient not taking: Reported on 1/3/2025), Disp: , Rfl: 0  •  naproxen (Naprosyn) 500 mg tablet, Take 1 tablet (500 mg total) by mouth 2 (two) times a day with meals (Patient not taking: Reported on 1/3/2025), Disp: 30 tablet, Rfl: 0  •  ondansetron (ZOFRAN-ODT) 4 mg disintegrating tablet, Take 1 tablet (4 mg total) by mouth every 6 (six) hours as needed for nausea or vomiting (Patient not taking: Reported on  "1/3/2025), Disp: 20 tablet, Rfl: 0  •  pantoprazole (PROTONIX) 40 mg tablet, Take 1 tablet (40 mg total) by mouth daily (Patient not taking: Reported on 1/3/2025), Disp: 21 tablet, Rfl: 0    Current Allergies     Allergies as of 2025   • (No Known Allergies)            The following portions of the patient's history were reviewed and updated as appropriate: allergies, current medications, past family history, past medical history, past social history, past surgical history and problem list.     Past Medical History:   Diagnosis Date   • Obesity (BMI 35.0-39.9 without comorbidity) 2022       Past Surgical History:   Procedure Laterality Date   • CHOLECYSTECTOMY LAPAROSCOPIC N/A 2023    Procedure: CHOLECYSTECTOMY LAPAROSCOPIC WITH INTRAOPERATIVE CHOLANGIOGRAM;  Surgeon: Onel Gloria MD;  Location: [facility];  Service: General   • MYRINGOTOMY W/ TUBES     • [place]  DELIVERY ONLY N/A 12/10/2022    Procedure:  SECTION ();  Surgeon: Radha Andres DO;  Location: [facility];  Service: Obstetrics       Family History   Problem Relation Age of Onset   • Hyperlipidemia Mother    • No Known Problems Father          Medications have been verified.        Objective   /70   Pulse 104   Temp 98.6 °F (37 °C)   Resp 18   Ht 5' 6\" (1.676 m)   Wt 105 kg (231 lb)   SpO2 99%   Breastfeeding Yes   BMI 37.28 kg/m²   No LMP recorded.       Physical Exam     Physical Exam  Vitals and nursing note reviewed.   Constitutional:       Appearance: Normal appearance.   HENT:      Head: Normocephalic and atraumatic.   Cardiovascular:      Rate and Rhythm: Normal rate.   Pulmonary:      Effort: Pulmonary effort is normal.   Abdominal:      Tenderness: There is no abdominal tenderness.      Comments: No CVA tenderness   Skin:     General: Skin is warm.   Neurological:      Mental Status: She is alert.                   "

## 2025-01-03 NOTE — PATIENT INSTRUCTIONS
Drink plenty of fluids  If symptoms fail to improve follow up with PCP    If tests have been performed at [facility], our office will contact you with results if changes need to be made to the care plan discussed with you at the visit.  You can review your full results on [facility] MyChart

## 2025-01-05 ENCOUNTER — RESULTS FOLLOW-UP (OUTPATIENT)
Dept: URGENT CARE | Facility: MEDICAL CENTER | Age: 20
End: 2025-01-05

## 2025-01-05 LAB — BACTERIA UR CULT: NORMAL

## 2025-02-12 NOTE — LETTER
September 9, 2019     Patient: Marley Orosco   YOB: 2005   Date of Visit: 9/9/2019       To Whom it May Concern:    Kvng Gomes was seen in my clinic on 9/9/2019  She may return to school on 9/10/19 and may return to gym class or sports with limited activity until 9/10/19; please excuse from activities that irritate the pelvis/tailbone such as running, sit-ups, etc until pelvic pain has resolved  Please allow patient to use her choice of a pillow, donut or other cushioned seat in school if she chooses  If you have any questions or concerns, please don't hesitate to call           Sincerely,          JIM Aldrich        CC: No Recipients allow for proper functional mobility to enable patient to return to position changes with no ? pain.   [] Progressing: [x] Met: [] Not Met: [] Adjusted  4. Patient will return to all ADL without increased symptoms or restriction.   [] Progressing: [x] Met: [] Not Met: [] Adjusted  5. Pt will exhibit no pain with all transitional movements. (patient specific functional goal)    [x] Progressing: [] Met: [] Not Met: [] Adjusted       Overall Progression Towards Functional goals/ Treatment Progress Update:  [x] Patient is progressing as expected towards functional goals listed.    [] Progression is slowed due to complexities/Impairments listed.  [] Progression has been slowed due to co-morbidities.  [] Plan just implemented, too soon (<30days) to assess goals progression   [] Goals require adjustment due to lack of progress  [] Patient is not progressing as expected and requires additional follow up with physician  [] Other:     TREATMENT PLAN       Interventions:  Therapeutic Exercise (66482) including: strength training, ROM, and functional mobility  Therapeutic Activities (76156) including: functional mobility training and education.  Neuromuscular Re-education (39782) activation and proprioception, including postural re-education.    Manual Therapy (46954) as indicated to include: Passive Range of Motion, Gr I-IV mobilizations, Grade V Manipulation, Dry Needling/IASTM, Trigger Point Release, and Myofascial Release  Modalities as needed that may include: Cryotherapy, Electrical Stimulation, Thermal Agents, and Traction  Patient education on postural re-education, activity modification, and progression of HEP    Plan: Cont POC- Continue emphasis/focus on exercise progression, improving proper muscle recruitment and activation/motor control patterns, modulating pain, promoting relaxation, increasing ROM, reduce/eliminate soft tissue swelling/inflammation/restriction, and improving soft tissue extensibility. Next visit

## (undated) DEVICE — STOPCOCK 3-WAY

## (undated) DEVICE — ALLENTOWN LAP CHOLE APP PACK: Brand: CARDINAL HEALTH

## (undated) DEVICE — ELECTRODE LAP J HOOK SPLIT STEM E-Z CLEAN 33CM -0021S

## (undated) DEVICE — SYRINGE 30ML LL

## (undated) DEVICE — GLOVE SRG BIOGEL ECLIPSE 6.5

## (undated) DEVICE — BLUE HEAT SCOPE WARMER

## (undated) DEVICE — TAUT INTRODUCER KIT

## (undated) DEVICE — TAUT CATH INTRODUCER 4.5 FR

## (undated) DEVICE — IV EXTENSION TUBING 33 IN

## (undated) DEVICE — GLOVE INDICATOR PI UNDERGLOVE SZ 6.5 BLUE

## (undated) DEVICE — 3000CC GUARDIAN II: Brand: GUARDIAN

## (undated) DEVICE — INTENDED FOR TISSUE SEPARATION, AND OTHER PROCEDURES THAT REQUIRE A SHARP SURGICAL BLADE TO PUNCTURE OR CUT.: Brand: BARD-PARKER SAFETY BLADES SIZE 11, STERILE

## (undated) DEVICE — GLOVE SRG BIOGEL ECLIPSE 7.5

## (undated) DEVICE — PACK C-SECTION PBDS

## (undated) DEVICE — DRAPE C-ARM X-RAY

## (undated) DEVICE — CHLORAPREP HI-LITE 26ML ORANGE

## (undated) DEVICE — SUT VICRYL 0 CTX 36 IN J978H

## (undated) DEVICE — ADHESIVE SKIN HIGH VISCOSITY EXOFIN MICRO 0.5ML

## (undated) DEVICE — SUT MONOCRYL 4-0 PS-2 27 IN Y426H

## (undated) DEVICE — PLUMEPEN PRO 10FT

## (undated) DEVICE — TROCAR: Brand: KII FIOS FIRST ENTRY

## (undated) DEVICE — TROCAR: Brand: KII SLEEVE

## (undated) DEVICE — GLOVE SRG BIOGEL 6.5

## (undated) DEVICE — TISSUE RETRIEVAL SYSTEM: Brand: INZII RETRIEVAL SYSTEM

## (undated) DEVICE — ABG MICROSTICKS SAFETY

## (undated) DEVICE — METZENBAUM ADTEC SINGLE USE DISSECTING SCISSORS, SHAFT ONLY, MONOPOLAR, CURVED TO LEFT, WORKING LENGTH: 12 1/4", (310 MM), DIAM. 5 MM, INSULATED, DOUBLE ACTION, STERILE, DISPOSABLE, PACKAGE OF 10 PIECES: Brand: AESCULAP

## (undated) DEVICE — GLOVE INDICATOR PI UNDERGLOVE SZ 8 BLUE

## (undated) DEVICE — ADHESIVE SKIN HIGH VISCOSITY EXOFIN 1ML

## (undated) DEVICE — SYRINGE 20ML LL

## (undated) DEVICE — DRAPE EQUIPMENT RF WAND

## (undated) DEVICE — SCD SEQUENTIAL COMPRESSION COMFORT SLEEVE MEDIUM KNEE LENGTH: Brand: KENDALL SCD

## (undated) DEVICE — SUT VICRYL 0 UR-6 27 IN J603H

## (undated) DEVICE — PDS II VLT 0 107CM AG ST3: Brand: ENDOLOOP

## (undated) DEVICE — PMI DISPOSABLE PUNCTURE CLOSURE DEVICE / SUTURE GRASPER: Brand: PMI

## (undated) DEVICE — IRRIG ENDO FLO TUBING

## (undated) DEVICE — LIGAMAX 5 MM ENDOSCOPIC MULTIPLE CLIP APPLIER: Brand: LIGAMAX

## (undated) DEVICE — TUBING SMOKE EVAC W/FILTRATION DEVICE PLUMEPORT ACTIV

## (undated) DEVICE — SUT PLAIN 3-0 CT-1 27 IN 842H

## (undated) DEVICE — [HIGH FLOW INSUFFLATOR,  DO NOT USE IF PACKAGE IS DAMAGED,  KEEP DRY,  KEEP AWAY FROM SUNLIGHT,  PROTECT FROM HEAT AND RADIOACTIVE SOURCES.]: Brand: PNEUMOSURE